# Patient Record
Sex: MALE | Race: WHITE | NOT HISPANIC OR LATINO | Employment: OTHER | URBAN - NONMETROPOLITAN AREA
[De-identification: names, ages, dates, MRNs, and addresses within clinical notes are randomized per-mention and may not be internally consistent; named-entity substitution may affect disease eponyms.]

---

## 2017-05-15 ENCOUNTER — AMBULATORY - GICH (OUTPATIENT)
Dept: UROLOGY | Facility: OTHER | Age: 67
End: 2017-05-15

## 2017-05-15 DIAGNOSIS — N40.2 NODULAR PROSTATE WITHOUT LOWER URINARY TRACT SYMPTOMS: ICD-10-CM

## 2017-06-29 ENCOUNTER — OFFICE VISIT - GICH (OUTPATIENT)
Dept: UROLOGY | Facility: OTHER | Age: 67
End: 2017-06-29

## 2017-06-29 ENCOUNTER — HISTORY (OUTPATIENT)
Dept: UROLOGY | Facility: OTHER | Age: 67
End: 2017-06-29

## 2017-06-29 ENCOUNTER — AMBULATORY - GICH (OUTPATIENT)
Dept: LAB | Facility: OTHER | Age: 67
End: 2017-06-29

## 2017-06-29 DIAGNOSIS — N40.2 NODULAR PROSTATE WITHOUT LOWER URINARY TRACT SYMPTOMS: ICD-10-CM

## 2017-06-29 DIAGNOSIS — R97.20 ELEVATED PROSTATE SPECIFIC ANTIGEN (PSA): ICD-10-CM

## 2017-06-29 LAB — PSA TOTAL (DIAGNOSTIC) - HISTORICAL: 4.43 NG/ML

## 2017-07-03 ENCOUNTER — AMBULATORY - GICH (OUTPATIENT)
Dept: UROLOGY | Facility: OTHER | Age: 67
End: 2017-07-03

## 2017-07-03 ENCOUNTER — HISTORY (OUTPATIENT)
Dept: UROLOGY | Facility: OTHER | Age: 67
End: 2017-07-03

## 2017-07-03 DIAGNOSIS — R97.20 ELEVATED PROSTATE SPECIFIC ANTIGEN (PSA): ICD-10-CM

## 2017-07-05 ENCOUNTER — COMMUNICATION - GICH (OUTPATIENT)
Dept: UROLOGY | Facility: OTHER | Age: 67
End: 2017-07-05

## 2017-07-14 ENCOUNTER — OFFICE VISIT - GICH (OUTPATIENT)
Dept: UROLOGY | Facility: OTHER | Age: 67
End: 2017-07-14

## 2017-07-14 ENCOUNTER — HISTORY (OUTPATIENT)
Dept: UROLOGY | Facility: OTHER | Age: 67
End: 2017-07-14

## 2017-07-14 DIAGNOSIS — Z80.42 FAMILY HISTORY OF MALIGNANT NEOPLASM OF PROSTATE: ICD-10-CM

## 2017-07-14 DIAGNOSIS — R97.20 ELEVATED PROSTATE SPECIFIC ANTIGEN (PSA): ICD-10-CM

## 2017-10-03 ENCOUNTER — COMMUNICATION - GICH (OUTPATIENT)
Dept: FAMILY MEDICINE | Facility: OTHER | Age: 67
End: 2017-10-03

## 2017-10-03 DIAGNOSIS — I10 ESSENTIAL (PRIMARY) HYPERTENSION: ICD-10-CM

## 2017-10-30 ENCOUNTER — COMMUNICATION - GICH (OUTPATIENT)
Dept: SURGERY | Facility: OTHER | Age: 67
End: 2017-10-30

## 2017-12-11 ENCOUNTER — COMMUNICATION - GICH (OUTPATIENT)
Dept: SURGERY | Facility: OTHER | Age: 67
End: 2017-12-11

## 2017-12-11 ENCOUNTER — HISTORY (OUTPATIENT)
Dept: FAMILY MEDICINE | Facility: OTHER | Age: 67
End: 2017-12-11

## 2017-12-11 ENCOUNTER — OFFICE VISIT - GICH (OUTPATIENT)
Dept: FAMILY MEDICINE | Facility: OTHER | Age: 67
End: 2017-12-11

## 2017-12-11 DIAGNOSIS — E66.9 OBESITY: ICD-10-CM

## 2017-12-11 DIAGNOSIS — Z12.11 ENCOUNTER FOR SCREENING FOR MALIGNANT NEOPLASM OF COLON: ICD-10-CM

## 2017-12-11 DIAGNOSIS — R97.20 ELEVATED PROSTATE SPECIFIC ANTIGEN (PSA): ICD-10-CM

## 2017-12-11 DIAGNOSIS — Z23 ENCOUNTER FOR IMMUNIZATION: ICD-10-CM

## 2017-12-11 DIAGNOSIS — I10 ESSENTIAL (PRIMARY) HYPERTENSION: ICD-10-CM

## 2017-12-11 DIAGNOSIS — Z00.00 ENCOUNTER FOR GENERAL ADULT MEDICAL EXAMINATION WITHOUT ABNORMAL FINDINGS: ICD-10-CM

## 2017-12-11 DIAGNOSIS — E78.5 HYPERLIPIDEMIA: ICD-10-CM

## 2017-12-11 LAB
ANION GAP - HISTORICAL: 12 (ref 5–18)
BUN SERPL-MCNC: 24 MG/DL (ref 7–25)
BUN/CREAT RATIO - HISTORICAL: 24
CALCIUM SERPL-MCNC: 9.6 MG/DL (ref 8.6–10.3)
CHLORIDE SERPLBLD-SCNC: 101 MMOL/L (ref 98–107)
CHOL/HDL RATIO - HISTORICAL: 4.88
CHOLESTEROL TOTAL: 161 MG/DL
CO2 SERPL-SCNC: 29 MMOL/L (ref 21–31)
CREAT SERPL-MCNC: 1.02 MG/DL (ref 0.7–1.3)
GFR IF NOT AFRICAN AMERICAN - HISTORICAL: >60 ML/MIN/1.73M2
GLUCOSE SERPL-MCNC: 93 MG/DL (ref 70–105)
HDLC SERPL-MCNC: 33 MG/DL (ref 23–92)
LDLC SERPL CALC-MCNC: 64 MG/DL
NON-HDL CHOLESTEROL - HISTORICAL: 128 MG/DL
POTASSIUM SERPL-SCNC: 3.7 MMOL/L (ref 3.5–5.1)
PROVIDER ORDERDED STATUS - HISTORICAL: ABNORMAL
SODIUM SERPL-SCNC: 142 MMOL/L (ref 133–143)
TRIGL SERPL-MCNC: 319 MG/DL

## 2017-12-18 ENCOUNTER — AMBULATORY - GICH (OUTPATIENT)
Dept: UROLOGY | Facility: OTHER | Age: 67
End: 2017-12-18

## 2017-12-18 DIAGNOSIS — R97.20 ELEVATED PROSTATE SPECIFIC ANTIGEN (PSA): ICD-10-CM

## 2017-12-28 NOTE — PROGRESS NOTES
"Patient Information     Patient Name MRMarbin Gilliam [id number] Male 1950      Progress Notes by Rupesh Dozier MD at 7/3/2017 11:45 AM     Author:  Rupesh Dozier MD Service:  (none) Author Type:  Physician     Filed:  7/3/2017 12:28 PM Encounter Date:  7/3/2017 Status:  Signed     :  Rupesh Dozier MD (Physician)            Preoperative diagnosis  Elevated PSA    Postoperative diagnosis  Elevated PSA    Procedure  Prostate biopsy  Transrectal ultrasound of the prostate  Transrectal ultrasound guidance of needle biopsy  Intramuscular Injection of local anesthetic, periprostatic nerve block    Surgeon  Rupesh Dozier MD    Anesthesia  1% lidocaine jelly, intrarectal instillation, 10mL  2% lidocaine solution, periprostatic injection, 10mL    Complications  None    Specimen  Prostate biopsy x 12 cores    Indications  Mr. Adams is a 67 y.o. year old male with an elevated PSA.  After discussing his options, the patient decided to proceed with prostate biopsy.  Informed consent was obtained. Possible complications were discussed with the patient during his last visit including, but not limited to, hematuria, hematochezia, prostatitis, urinary tract infection, sepsis, and urinary retention.    PSA TOTAL (DIAGNOSTIC) (ng/mL)    Date Value   2017 4.430 (H)   10/31/2013 1.58         Exam  Prostate:   Normal rectal tone, 50 grams.      Symmetric, non-tender, anodular and no induration.      Procedure  The patient was positioned and prepped in a left lateral position with lower extremities flexed.  Lidocaine jelly, 2%, was injected per rectum and gentamicin 120mg was injected intramuscularly. ARCHIE was performed.  The rectal ultrasound probe was slowly introduced into the rectum.  A 22 gauge, 8\" needle was used to perform a periprostatic injection of lidocaine 1%, 10mL through the ultrasound probe.  The prostate and seminal vesicles were inspected systematically using cross and sagittal views with the " ultrasound.  There were not hypoechoic areas within the prostate.  The dimensions of the prostate were measured to be 46mm X 56mm X 40mm, for a calculated volume of 55g.  Using a true cut 14 Fr biopsy needle, 12 prostate cores were collected. The specific locations on the left were the following: lateral base, lateral mid, lateral apex, medial base, medial mid, and medial apex.  The right side was sampled in a similar manner.  The ultrasound probe was removed.  The patient tolerated the procedure well.    Plan  The patient was instructed to drink plenty of fluids and warned about possible complications and side effects including, but not limited to, blood in the urine, stool and semen as well as bloodstream infection.  He was instructed to call the office or go straight to the ED if he develops fevers or flu-like symptoms.    A hand out explaining this was provided as well  The patient will return to clinic within a week for discussion of the path report.

## 2017-12-28 NOTE — PROGRESS NOTES
Patient Information     Patient Name MRN Sex Marbin Lord [id number] Male 1950      Progress Notes by Rupesh Dozier MD at 2017 10:00 AM     Author:  Rupesh Dozier MD Service:  (none) Author Type:  Physician     Filed:  2017 10:49 AM Encounter Date:  2017 Status:  Signed     :  Rupesh Dozier MD (Physician)            Type of Visit  EST    Chief Complaint  Elevated PSA  Abnormal ARCHIE  Family history of prostate cancer    HPI  Mr. Adams is a 65 y.o. male who follows up with an abnormal ARCHIE.  He does have a family history of prostate cancer.  His brother was diagnosed 12 years ago in his 60s and underwent prostatectomy.  He did well following surgery.    The patient has not previously undergone prostate biopsy.  No associated worsening LUTS, dysuria or prostatitis at the time of the PSA.  The most recent PSA was collected today.  I saw him last one year ago.  He reports no changes in the interim regarding his health.  He also denies any new lower urinary tract symptoms.      Family History  Family History       Problem   Relation Age of Onset     Other  Mother      Hx of TIAs       Other  Father      Dementia       Cancer-prostate  Brother      Other  Brother      Skin cancer       Other  Sister       as an infant         Review of Systems  I reviewed the ROS the patient today.    Nursing Notes:   Chayito Shaffer  2017 10:16 AM  Signed  Patient presents to the clinic for follow up on PSA levels.  Chayito Shaffer LPN........................2017  10:00 AM    Review of Systems:    Weight loss:    No     Recent fever/chills:  No   Night sweats:   No  Current skin rash:  No   Recent hair loss:  No  Heat intolerance:  No   Cold intolerance:  No  Chest pain:   No   Palpitations:   No  Shortness of breath:  No   Wheezing:   No  Constipation:    No   Diarrhea:   No   Nausea:   No   Vomiting:   No   Kidney/side pain:  No   Back pain:   No  Frequent headaches:  No   Dizziness:      No  Leg swelling:   No   Calf pain:    No        Physical Exam  Vitals:     06/29/17 1001   BP: 132/66   Pulse: 60   Weight: 100.4 kg (221 lb 6.4 oz)   Constitutional: No acute distress.  Alert and cooperative   Head: NCAT  Eyes: Conjunctivae normal  Cardiovascular: Regular rate.  Pulmonary/Chest: Respirations are even and non-labored bilaterally, no audible wheezing  Abdominal: Soft. No distension, tenderness, masses or guarding.   Neurological: A + O x 3.  Cranial Nerves II-XII grossly intact.  Extremities: CAROL x 4, Warm. No clubbing.  No cyanosis.    Skin: Pink, warm and dry.  No visible rashes noted.  Psychiatric:  Normal mood and affect  Back:  No left CVA tenderness.  No right CVA tenderness.  Genitourinary:  Nonpalpable bladder  Normal male phallus without discharge or lesions, normal pubic hair distribution.    Testicles descended bilaterally.  Prostate:  Normal rectal tone, 30 grams.  Symmetric, non-tender, anodular and no induration.      Labs  Results for ANNITA ADAMS (MRN [id number]) as of 6/29/2017 10:16   10/31/2013 08:35 11/20/2014 11:26 5/22/2015 07:32 6/13/2016 09:27 6/29/2017 07:50   PSA TOTAL (DIAGNOSTIC) 1.58       PSA TOTAL (DIAGNOSTIC)  1.580 2.212 1.769 4.430 (H)       CREATININE (mg/dL)    Date Value   12/07/2016 0.94     Assessment & Plan  Mr. Adams is a 65 y.o. male who follows up with increasing PSA with family history of prostatye cancer.    I explained to the patient that an elevated PSA is a marker of risk of prostate cancer and a prostate biopsy would be the next step in diagnosis.  I explained that sampling error can occur with any biopsy and there is a risk of potentially missing a cancer that may be present.    I discussed the risks, benefits, and alternatives to prostate biopsy, including hematuria, hematochezia, and hematospermia.  I also discussed the risk of diagnosing a clinically-insignificant prostate cancer.  I discussed the risks of sepsis, which can be minimized by  prophylactic antibiotics.     Plan  Recommended TRUS biopsy of prostate   Xanax provided   Gentamicin 120mg IM prior to the biopsy.   Ciprofloxacin 500mg po bid for 3 days to start the day prior to biopsy.   Patient denies taking antiplatelets or anticoagulant medication.

## 2017-12-28 NOTE — PROGRESS NOTES
"Patient Information     Patient Name MRN Sex Marbin Lord [id number] Male 1950      Progress Notes by Rupesh Dozier MD at 2017 12:00 PM     Author:  Rupesh Dozier MD  Service:  (none) Author Type:  Physician     Filed:  2017 12:13 PM  Encounter Date:  2017 Status:  Addendum     :  Rupesh Dozier MD (Physician)        Related Notes: Original Note by Rupesh Dozier MD (Physician) filed at 2017 12:13 PM            Type of Visit  Established    Chief Complaint  Elevated PSA    HPI  Mr. Adams is a 67 y.o. male who is status post TRUS guided biopsy of the prostate.    He is doing well and denies current rectal bleeding or gross hematuria.   He denies fevers or chills.     He does have a family history of prostate cancer.  His brother was diagnosed 11 years ago in his 60s and underwent prostatectomy.      Review of Systems  I reviewed the ROS the patient today.    Nursing Notes:   Valencia Babcock RN  2017 12:00 PM  Signed  Review of Systems:    Weight loss:    No     Recent fever/chills:  No   Night sweats:   No  Current skin rash:  No   Recent hair loss:  No  Heat intolerance:  No   Cold intolerance:  No  Chest pain:   No   Palpitations:   No  Shortness of breath:  No   Wheezing:   No  Constipation:    No   Diarrhea:   No   Nausea:   No   Vomiting:   No   Kidney/side pain:  No   Back pain:   No  Frequent headaches:  No   Dizziness:     No  Leg swelling:   No   Calf pain:    No    Family History  Family History       Problem   Relation Age of Onset     Other  Mother      Hx of TIAs       Other  Father      Dementia       Cancer-prostate  Brother      Other  Brother      Skin cancer       Other  Sister       as an infant         Physical Exam  Vitals:     17 1158   BP: 110/78   Pulse: 64   Resp: 20   Weight: 98.7 kg (217 lb 9.6 oz)   Height: 1.803 m (5' 11\")     Constitutional: NAD, WDWN.  Cardiovascular: Regular rate.  Pulmonary/Chest: Respirations are even and " non-labored bilaterally.  Abdominal: Soft. No distension, tenderness, masses or guarding. No CVA tenderness.  Extremities: CAROL x 4, Warm. No clubbing.  No cyanosis.    Skin: Pink, warm and dry.  No rashes noted.  Prostate:   Normal rectal tone, 50 grams.      Symmetric, non-tender, anodular and no induration.      Labs  PSA TOTAL (DIAGNOSTIC) (ng/mL)    Date Value   06/29/2017 4.430 (H)   10/31/2013 1.58       Pathology  7/3/2017  Negative 12 cores out of 12 cores  Calculated prostate volume based on TRUS: 55 grams    Assessment  Mr. Adams is a 67 y.o. male who is status post prostate biopsy.    I discussed the pathology report, which revealed no evidence of cancer.    Plan  Follow up in 6 months with PSA.        I spent 10 minutes on this patient's visit and over half of this time was spent in face-to-face counseling regarding biopsy report, prognosis, importance of compliance and plans going forward.

## 2017-12-28 NOTE — TELEPHONE ENCOUNTER
Patient Information     Patient Name MRN Marbin Oneil [id number] Male 1950      Telephone Encounter by Valencia Babcock RN at 2017  9:11 AM     Author:  Valencia Babcock RN Service:  (none) Author Type:  NURS- Registered Nurse     Filed:  2017  9:11 AM Encounter Date:  2017 Status:  Signed     :  Valencia Babcock RN (NURS- Registered Nurse)            Verified that results are not back yet.  Valencia Babcock RN.........2017...9:11 AM

## 2017-12-29 NOTE — PATIENT INSTRUCTIONS
Patient Information     Patient Name MRN Marbin Oneil [id number] Male 1950      Patient Instructions by Valencia Babcock RN at 7/3/2017 11:45 AM     Author:  Valencia Babcock RN Service:  (none) Author Type:  NURS- Registered Nurse     Filed:  7/3/2017 11:56 AM Encounter Date:  7/3/2017 Status:  Signed     :  Valencia Babcock RN (NURS- Registered Nurse)            Home Care after Prostate Biopsy   Follow these guidelines for your care after your procedure.    Activity  Please limit your activity until there is no blood in your urine and no bleeding from the rectum.  This usually takes 2-3 days.  If you return to normal activity and blood returns in your urine or stool please return to limited activities.    Symptoms  You may notice some blood in your urine and/or stool.  Please increase water intake for the next few days to minimize burning.  Please avoid constipation by increasing fiber in your diet as this will make bleeding per rectum worse.  Also, you may experience blood in your semen.  This symptom is common after this procedure and it may take months for it to resolve.  If you experience this symptom there are no special precautions or management needed.    Contacts  General Questions: [phone]  Appointments:  [phone]  Emergencies:  [phone]    Emergency Information  If you develop fevers and/or chills of 101 degrees or greater please go to the emergency department immediately as this may represent a serious infection in your blood stream.  Once you get to the ED please have the physician call Dr Dozier immediately.    Follow up  Please follow up as scheduled to discuss the pathology results of the biopsy.

## 2017-12-30 NOTE — NURSING NOTE
Patient Information     Patient Name MRN Marbin Oneil [id number] Male 1950      Nursing Note by Chayito Shaffer at 2017 10:00 AM     Author:  Chayito Shaffer Service:  (none) Author Type:  (none)     Filed:  2017 10:16 AM Encounter Date:  2017 Status:  Signed     :  Chayito Shaffer            Patient presents to the clinic for follow up on PSA levels.  Chayito Shaffer LPN........................2017  10:00 AM    Review of Systems:    Weight loss:    No     Recent fever/chills:  No   Night sweats:   No  Current skin rash:  No   Recent hair loss:  No  Heat intolerance:  No   Cold intolerance:  No  Chest pain:   No   Palpitations:   No  Shortness of breath:  No   Wheezing:   No  Constipation:    No   Diarrhea:   No   Nausea:   No   Vomiting:   No   Kidney/side pain:  No   Back pain:   No  Frequent headaches:  No   Dizziness:     No  Leg swelling:   No   Calf pain:    No

## 2017-12-30 NOTE — NURSING NOTE
Patient Information     Patient Name MRN Sex Marbin Lord [id number] Male 1950      Nursing Note by Valencia Babcock RN at 2017 12:00 PM     Author:  Valencia Babcock RN Service:  (none) Author Type:  NURS- Registered Nurse     Filed:  2017 12:00 PM Encounter Date:  2017 Status:  Signed     :  Valencia Babcock RN (NURS- Registered Nurse)            Review of Systems:    Weight loss:    No     Recent fever/chills:  No   Night sweats:   No  Current skin rash:  No   Recent hair loss:  No  Heat intolerance:  No   Cold intolerance:  No  Chest pain:   No   Palpitations:   No  Shortness of breath:  No   Wheezing:   No  Constipation:    No   Diarrhea:   No   Nausea:   No   Vomiting:   No   Kidney/side pain:  No   Back pain:   No  Frequent headaches:  No   Dizziness:     No  Leg swelling:   No   Calf pain:    No

## 2017-12-30 NOTE — NURSING NOTE
Patient Information     Patient Name MRN Marbin Oneil [id number] Male 1950      Nursing Note by Valencia Babcock RN at 2017 12:00 PM     Author:  Valencia Babcock RN Service:  (none) Author Type:  NURS- Registered Nurse     Filed:  2017 12:56 PM Encounter Date:  2017 Status:  Signed     :  Valencia Babcock RN (NURS- Registered Nurse)            Vasectomy  Per verbal order read back by Rupesh Dozier MD to prep patient for vasectomy.  Patient positioned in supine position, perineum area prepped with chlorhexidene Gluconate and patient draped per sterile technique.    Lidocaine 2%  Lot #: [id number]  Expiration date: 2018  : [employer]  NDC: 3320-9764-85    [place] Protocol    A. Pre-procedure verification complete yes  1-relevant information / documentation available, reviewed and properly matched to the patient; 2-consent accurate and complete, 3-equipment and supplies available    B. Site marking complete N/A  Site marked if not in continuous attendance with patient    C. TIME OUT completed yes  Time Out was conducted just prior to starting procedure to verify the eight required elements: 1-patient identity, 2-consent accurate and complete, 3-position, 4-correct side/site marked (if applicable), 5-procedure, 6-relevant images / results properly labeled and displayed (if applicable), 7-antibiotics / irrigation fluids (if applicable), 8-safety precautions.    After procedure perineum area rinsed. Semen analysis container given to patient. Patient reminded to have a semen analysis performed 3 months after the procedure to confirm sterility and to ejaculate about 1-2 dozen times following the vasectomy and prior to semen collection. Discharge instructions reviewed with patient. Patient verbalized understanding of discharge instructions and discharged ambulatory.

## 2017-12-30 NOTE — NURSING NOTE
Patient Information     Patient Name MRN Sex Marbin Lord [id number] Male 1950      Nursing Note by Valencia Babcock RN at 7/3/2017 11:45 AM     Author:  Valencia Babcock RN Service:  (none) Author Type:  NURS- Registered Nurse     Filed:  7/3/2017 12:36 PM Encounter Date:  7/3/2017 Status:  Signed     :  Valencia Babocck RN (NURS- Registered Nurse)            TRUS  Per verbal order read back by Rupesh Dozier MD, Urojet 10mL 2% lidocaine jelly to be instilled into the rectum.  Patient positioned on left side with knees to chest.  Urojet- 10ml 2% Lidocaine jelly instilled into the rectum.    Urojet 2%  Lot#: [id number]  Expiration date:   : [employer]  NDC: [address]-5    Lidocaine 2%  Lot #: [id number]  Expiration date: 2017  : [employer]  NDC: 9672-0436-18    [place] Protocol    A. Pre-procedure verification complete yes  1-relevant information / documentation available, reviewed and properly matched to the patient; 2-consent accurate and complete, 3-equipment and supplies available    B. Site marking complete N/A  Site marked if not in continuous attendance with patient    C. TIME OUT completed yes  Time Out was conducted just prior to starting procedure to verify the eight required elements: 1-patient identity, 2-consent accurate and complete, 3-position, 4-correct side/site marked (if applicable), 5-procedure, 6-relevant images / results properly labeled and displayed (if applicable), 7-antibiotics / irrigation fluids (if applicable), 8-safety precautions.    After procedure perineum area rinsed. Discharge instructions reviewed with patient. Patient verbalized understanding of discharge instructions and discharged ambulatory.

## 2018-01-15 ENCOUNTER — HOSPITAL ENCOUNTER (OUTPATIENT)
Dept: SURGERY | Facility: OTHER | Age: 68
Discharge: HOME | End: 2018-01-15
Attending: SURGERY | Admitting: SURGERY

## 2018-01-15 ENCOUNTER — SURGERY (OUTPATIENT)
Dept: SURGERY | Facility: OTHER | Age: 68
End: 2018-01-15

## 2018-01-15 ENCOUNTER — TRANSFERRED RECORDS (OUTPATIENT)
Dept: MULTI SPECIALTY CLINIC | Facility: CLINIC | Age: 68
End: 2018-01-15

## 2018-01-15 ENCOUNTER — HISTORY (OUTPATIENT)
Dept: SURGERY | Facility: OTHER | Age: 68
End: 2018-01-15

## 2018-01-16 ENCOUNTER — AMBULATORY - GICH (OUTPATIENT)
Dept: LAB | Facility: OTHER | Age: 68
End: 2018-01-16

## 2018-01-16 DIAGNOSIS — R97.20 ELEVATED PROSTATE SPECIFIC ANTIGEN (PSA): ICD-10-CM

## 2018-01-16 LAB — PSA TOTAL (DIAGNOSTIC) - HISTORICAL: 5.22 NG/ML

## 2018-01-17 ENCOUNTER — AMBULATORY - GICH (OUTPATIENT)
Dept: SURGERY | Facility: OTHER | Age: 68
End: 2018-01-17

## 2018-01-17 ENCOUNTER — COMMUNICATION - GICH (OUTPATIENT)
Dept: SURGERY | Facility: OTHER | Age: 68
End: 2018-01-17

## 2018-01-17 ENCOUNTER — HISTORY (OUTPATIENT)
Dept: SURGERY | Facility: OTHER | Age: 68
End: 2018-01-17

## 2018-01-18 ENCOUNTER — OFFICE VISIT - GICH (OUTPATIENT)
Dept: UROLOGY | Facility: OTHER | Age: 68
End: 2018-01-18

## 2018-01-18 ENCOUNTER — HISTORY (OUTPATIENT)
Dept: UROLOGY | Facility: OTHER | Age: 68
End: 2018-01-18

## 2018-01-18 DIAGNOSIS — Z80.42 FAMILY HISTORY OF MALIGNANT NEOPLASM OF PROSTATE: ICD-10-CM

## 2018-01-18 DIAGNOSIS — R97.20 ELEVATED PROSTATE SPECIFIC ANTIGEN (PSA): ICD-10-CM

## 2018-01-26 VITALS
HEART RATE: 64 BPM | BODY MASS INDEX: 30.46 KG/M2 | RESPIRATION RATE: 20 BRPM | HEIGHT: 71 IN | WEIGHT: 217.6 LBS | DIASTOLIC BLOOD PRESSURE: 78 MMHG | SYSTOLIC BLOOD PRESSURE: 110 MMHG

## 2018-01-26 VITALS
SYSTOLIC BLOOD PRESSURE: 132 MMHG | BODY MASS INDEX: 31.32 KG/M2 | DIASTOLIC BLOOD PRESSURE: 66 MMHG | HEART RATE: 60 BPM | WEIGHT: 221.4 LBS

## 2018-01-26 VITALS — HEART RATE: 68 BPM | BODY MASS INDEX: 30.72 KG/M2 | HEIGHT: 71 IN | WEIGHT: 219.4 LBS | RESPIRATION RATE: 16 BRPM

## 2018-01-31 ENCOUNTER — DOCUMENTATION ONLY (OUTPATIENT)
Dept: FAMILY MEDICINE | Facility: OTHER | Age: 68
End: 2018-01-31

## 2018-01-31 PROBLEM — E66.9 OBESITY: Status: ACTIVE | Noted: 2018-01-31

## 2018-01-31 PROBLEM — Z00.00 HEALTH CARE MAINTENANCE: Status: ACTIVE | Noted: 2018-01-04

## 2018-01-31 PROBLEM — R97.20 ELEVATED PSA: Status: ACTIVE | Noted: 2017-07-14

## 2018-01-31 RX ORDER — ASPIRIN 81 MG
1 TABLET, DELAYED RELEASE (ENTERIC COATED) ORAL DAILY
COMMUNITY

## 2018-01-31 RX ORDER — POLYETHYLENE GLYCOL 3350, SODIUM CHLORIDE, SODIUM BICARBONATE, POTASSIUM CHLORIDE 420; 11.2; 5.72; 1.48 G/4L; G/4L; G/4L; G/4L
240 POWDER, FOR SOLUTION ORAL
COMMUNITY
Start: 2017-12-11 | End: 2018-02-19

## 2018-01-31 RX ORDER — HYDROCHLOROTHIAZIDE 25 MG/1
25 TABLET ORAL DAILY
COMMUNITY
Start: 2017-12-11 | End: 2018-12-14

## 2018-01-31 RX ORDER — LISINOPRIL 10 MG/1
10 TABLET ORAL DAILY
COMMUNITY
Start: 2017-12-11 | End: 2018-12-14

## 2018-01-31 RX ORDER — ATENOLOL 100 MG/1
100 TABLET ORAL DAILY
COMMUNITY
Start: 2017-12-11 | End: 2018-12-14

## 2018-02-09 VITALS
BODY MASS INDEX: 31.24 KG/M2 | RESPIRATION RATE: 14 BRPM | SYSTOLIC BLOOD PRESSURE: 110 MMHG | DIASTOLIC BLOOD PRESSURE: 70 MMHG | WEIGHT: 224 LBS

## 2018-02-09 VITALS
WEIGHT: 224 LBS | HEART RATE: 60 BPM | HEIGHT: 71 IN | BODY MASS INDEX: 31.36 KG/M2 | SYSTOLIC BLOOD PRESSURE: 120 MMHG | DIASTOLIC BLOOD PRESSURE: 84 MMHG

## 2018-02-12 NOTE — NURSING NOTE
Patient Information     Patient Name MRN Sex Marbin Lord [id number] Male 1950      Nursing Note by Evie Jimenez at 2017  7:45 AM     Author:  Evie Jimenez Service:  (none) Author Type:  (none)     Filed:  2017  7:56 AM Encounter Date:  2017 Status:  Signed     :  Evie Jimenez            Patient presents today as a follow-up on meds.   Evie Jimenez LPN  2017  7:48 AM

## 2018-02-12 NOTE — PROGRESS NOTES
Patient Information     Patient Name MRN Sex Marbin Lord [id number] Male 1950      Progress Notes by Jerel Burkett MD at 2018  7:34 AM     Author:  Jerel Burkett MD Service:  (none) Author Type:  Physician     Filed:  2018  7:35 AM Date of Service:  2018  7:34 AM Status:  Signed     :  Jerel Burkett MD (Physician)            Ainsley Brandon      []Catalino copied text  []Hover for attribution information  Screening Questions for the Scheduling of Screening Colonoscopies   (If Colonoscopy is diagnostic, Provider should review the chart before scheduling.)  Are you younger than 50 or older than 80?  NO   Do you take aspirin or fish oil?  FISH OIL  (if yes, tell patient to stop 1 week prior to Colonoscopy)  Do you take warfarin (Coumadin), clopidogrel (Plavix), apixaban (Eliquis), dabigatram (Pradaxa), rivaroxaban (Xarelto) or any blood thinner? NO  Do you use oxygen at home?  NO   Do you have kidney disease? NO   Are you on dialysis? NO   Have you had a stroke or heart attack in the last year? NO   Have you had a stent in your heart or any blood vessel in the last year? NO   Have you had a transplant of any organ? NO   Have you had a colonoscopy or upper endoscopy (EGD) before? YES          When?    -  [facility]   Date of scheduled Colonoscopy. 01/15/2018  Provider VERONICA   Pharmacy GLOBE         Marbin Adams is a 67 y.o. male who presents for review of chronic health issues.     HPI: Patient has a history of hypertension which historically has been well-controlled on atenolol, hydrochlorothiazide and lisinopril. He has been tolerating these medications well. His weight is up about 8 pounds in the last year but he has started to walk and has been trying to watch his diet more closely. He's been following with Dr. Dozier for a rising PSA and had negative prostate biopsies in July. He is scheduled to see him again in January. He is due for colonoscopy. He has mild dyslipidemia and is managing  this with diet.     PROBLEM LIST:       Patient Active Problem List     Diagnosis  Code     DIVERTICULOSIS OF COLON K57.30     HYPERTENSION, BENIGN ESSENTIAL, CONTROLLED I10     EXOGENOUS OBESITY E66.9     Anal fissure K60.2     Dyslipidemia E78.5     Prostate nodule N40.2     Family history of prostate cancer Z80.42     Elevated PSA R97.20      PAST MEDICAL HISTORY:   Past Medical History         Past Medical History:   Diagnosis Date     HTN (hypertension)           SURGICAL HISTORY:   Past Surgical History          Past Surgical History:   Procedure Laterality Date     ANKLE FRACTURE TREATMENT        right     COLONOSCOPY SCREENING   2007     Due 2017     TONSILLECTOMY               SOCIAL HISTORY:   Social History    Social History            Social History     Marital status:        Spouse name: N/A     Number of children: N/A     Years of education: N/A          Occupational History     Not on file.              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       with two boys.  Enjoys fishing.         FAMILY HISTORY:         Family History       Problem   Relation Age of Onset     Other  Mother         Hx of TIAs       Other  Father         Dementia       Cancer-prostate  Brother       Other  Brother         Skin cancer       Other  Sister          as an infant        CURRENT MEDICATIONS:   Current Outpatient Prescriptions       Medication  Sig Dispense Refill     atenolol (TENORMIN) 100 mg tablet Take 1 tablet by mouth once daily. 90 tablet 3     hydroCHLOROthiazide (HCTZ) 25 mg tablet Take 1 tablet by mouth once daily. 90  "tablet 3     krill-omega-3-dha-epa-lipids (KRILL OIL) 780-32-77-50 mg cap Take 1 capsule by mouth once daily.   0     lisinopril (PRINIVIL; ZESTRIL) 10 mg tablet Take 1 tablet by mouth once daily. 90 tablet 3     MULTIVITAMIN W-MINERALS/LUTEIN (CENTRUM SILVER ORAL) Take 1 tablet by mouth once daily.          No current facility-administered medications for this visit.       Medications have been reviewed by me and are current to the best of my knowledge and ability.     ALLERGIES:  Sulfa (sulfonamide antibiotics)     REVIEW OF SYSTEMS:  General: denies any general problems.  Eyes: denies problems  Ears/Nose/Throat: denies problems  Cardiovascular: denies problems  Respiratory: denies problems  Gastrointestinal: denies problems  Genitourinary: denies problems  Musculoskeletal: denies problems  Skin: denies problems  Neurologic: denies problems  Psychiatric: denies problems  Endocrine: denies problems  Heme/Lymphatic: denies problems  Allergic/Immunologic: denies problems  PHQ Depression Screening 12/11/2017   Date of PHQ exam (doc flow) 12/11/2017   1. Lack of interest/pleasure 0 - Not at all   2. Feeling down/depressed 0 - Not at all   PHQ-2 TOTAL SCORE 0   3. Trouble sleeping -   4. Decreased energy -   5. Appetite change -   6. Feelings of failure -   7. Trouble concentrating -   8. Activity level -   9. Hurting yourself -   PHQ-9 TOTAL SCORE -   PHQ-9 Severity Level -   Functional Impairment -   Some recent data might be hidden       OBJECTIVE:  /84  Pulse 60  Ht 1.791 m (5' 10.5\")  Wt 101.6 kg (224 lb)  BMI 31.69 kg/m2  EXAM:   General Appearance: Pleasant, alert, appropriate appearance for age. No acute distress  Head Exam: Normal. Normocephalic, atraumatic.  Eye Exam:  Normal external eye, conjunctiva, lids, cornea. RACHEL.  Ear Exam: Normal TM's bilaterally. Normal auditory canals and external ears. Non-tender.  Nose Exam: Normal external nose, mucus membranes, and septum.  OroPharynx Exam:  Dental " hygiene adequate. Normal buccal mucosa. Normal pharynx.  Neck Exam:  Supple, no masses or nodes.  Thyroid Exam: No nodules or enlargement.  Chest/Respiratory Exam: Normal chest wall and respirations. Clear to auscultation.  Cardiovascular Exam: Regular rate and rhythm. S1, S2, no murmur, click, gallop, or rubs.  Gastrointestinal Exam: Soft, non-tender, no masses or organomegaly.  Rectal Exam: deferred  Genitourinary Exam Male: Normal male genitalia. No discharge or penile ulcerations. No testicular masses or swelling.  Lymphatic Exam: Non-palpable nodes in neck, clavicular, axillary, or inguinal regions.  Musculoskeletal Exam: Back is straight and non-tender, full ROM of upper and lower extremities.  Foot Exam: Left and right foot: good pedal pulses, no lesions, nail hygiene good.  Skin: Several small seborrheic keratoses noted on the trunk.  Neurologic Exam: Nonfocal, symmetric DTRs, normal gross motor, tone coordination and no tremor.  Psychiatric Exam: Alert and oriented - appropriate affect.     ASSESSMENT/PLAN:      ICD-10-CM     1. HYPERTENSION, BENIGN ESSENTIAL, CONTROLLED  Blood pressure appears well controlled. Continue current treatment. Basic metabolic panel drawn today.  I10 atenolol (TENORMIN) 100 mg tablet        hydroCHLOROthiazide (HCTZ) 25 mg tablet        lisinopril (PRINIVIL; ZESTRIL) 10 mg tablet        BASIC METABOLIC PANEL   2. Class 1 obesity without serious comorbidity with body mass index (BMI) of 31.0 to 31.9 in adult, unspecified obesity type  Encouraged patient to continue efforts to lose weight.  E66.9        Z68.31     3. Dyslipidemia  Lipid panel drawn today.  E78.5 LIPID PANEL   4. Elevated PSA  He will follow-up with Dr. Dozier in January as scheduled.  R97.20     5. Need for prophylactic vaccination and inoculation against influenza Z23 FLU VACCINE => 65 YRS HIGH DOSE TRIVALENT IIV3 IM   6. Colon cancer screening Z12.11 COLONOSCOPY SCREENING-[facility]         Carrington Elliott MD

## 2018-02-12 NOTE — H&P
Patient Information     Patient Name MRN Marbin Oneil [id number] Male 1950      H&P by Jerel Burkett MD at 1/15/2018  8:45 AM     Author:  Jerel Burkett MD Service:  (none) Author Type:  Physician     Filed:  1/15/2018  8:46 AM Date of Service:  1/15/2018  8:45 AM Status:  Signed     :  Jerel Burkett MD (Physician)            History and Physical    CHIEF COMPLAINT / REASON FOR PROCEDURE:  Healthcare maintenance     PERTINENT HISTORY   Patient is a 67 y.o. male who presents today for colonoscopy for Healthcare maintenance .   Last colonoscopy .  Patient has no complaints.    Past Medical History:     Diagnosis  Date     HTN (hypertension)      Past Surgical History:      Procedure  Laterality Date     ANKLE FRACTURE TREATMENT      right       COLONOSCOPY SCREENING      Due        PROSTATE BIOPSY  2017    Negative       TONSILLECTOMY         Bleeding tendencies:  No    ALLERGIES/SENSITIVITIES:   Allergies     Allergen  Reactions     Sulfa (Sulfonamide Antibiotics) Nausea And Vomiting        CURRENT MEDICATIONS:    No current facility-administered medications on file prior to encounter.      Current Outpatient Prescriptions on File Prior to Encounter       Medication  Sig Dispense Refill     atenolol (TENORMIN) 100 mg tablet Take 1 tablet by mouth once daily. 90 tablet 3     hydroCHLOROthiazide (HCTZ) 25 mg tablet Take 1 tablet by mouth once daily. 90 tablet 3     krill-omega-3-dha-epa-lipids (KRILL OIL) 914-25-46-50 mg cap Take 1 capsule by mouth once daily.  0     lisinopril (PRINIVIL; ZESTRIL) 10 mg tablet Take 1 tablet by mouth once daily. 90 tablet 3     MULTIVITAMIN W-MINERALS/LUTEIN (CENTRUM SILVER ORAL) Take 1 tablet by mouth once daily.         Physical Exam:   /92 (Cuff Size: Adult Regular)  Pulse 70  Temp 97.2  F (36.2  C)  Resp 18  SpO2 96%   EXAM:  Chest/Respiratory Exam: Normal.  Cardiovascular Exam: Normal.        PLAN:  Colonoscopy, Patient  understands risks of bleeding, perforation, potential inability to reach cecum, aspiration and wishes to proceed.

## 2018-02-12 NOTE — TELEPHONE ENCOUNTER
Patient Information     Patient Name MRN Sex Marbin Lord [id number] Male 1950      Telephone Encounter by Ainsley Brandon at 2017  3:32 PM     Author:  Ainsley Brandon Service:  (none) Author Type:  (none)     Filed:  2017  3:35 PM Encounter Date:  2017 Status:  Signed     :  Ainsley Brandon            Screening Questions for the Scheduling of Screening Colonoscopies   (If Colonoscopy is diagnostic, Provider should review the chart before scheduling.)  Are you younger than 50 or older than 80?  NO   Do you take aspirin or fish oil?  FISH OIL  (if yes, tell patient to stop 1 week prior to Colonoscopy)  Do you take warfarin (Coumadin), clopidogrel (Plavix), apixaban (Eliquis), dabigatram (Pradaxa), rivaroxaban (Xarelto) or any blood thinner? NO  Do you use oxygen at home?  NO   Do you have kidney disease? NO   Are you on dialysis? NO   Have you had a stroke or heart attack in the last year? NO   Have you had a stent in your heart or any blood vessel in the last year? NO   Have you had a transplant of any organ? NO   Have you had a colonoscopy or upper endoscopy (EGD) before? YES          When?    -  [facility]   Date of scheduled Colonoscopy. 01/15/2018  Provider VERONICA Waddell GLOBE

## 2018-02-12 NOTE — OR ANESTHESIA
Patient Information     Patient Name MRN Sex     Marbin Adams [id number] Male 1950      OR Anesthesia by Sarah Grove CRNA at 1/15/2018  9:11 AM     Author:  Sarah Grove CRNA Service:  (none) Author Type:  NURS- Nurse Anesthetist     Filed:  1/15/2018  9:11 AM Date of Service:  1/15/2018  9:11 AM Status:  Signed     :  Sarah Grove CRNA (NURS- Nurse Anesthetist)                                                           ANESTHESIA ASSESSMENT    Date: 1/15/18 Time: 9:11 AM      Patient:  Marbin Adams    Procedure(s) (LRB):  COLONOSCOPY (N/A)    Past Medical History:     Diagnosis  Date     HTN (hypertension)        Past Surgical History:      Procedure  Laterality Date     ANKLE FRACTURE TREATMENT      right       COLONOSCOPY SCREENING      Due        PROSTATE BIOPSY  2017    Negative       TONSILLECTOMY         Family History       Problem   Relation Age of Onset     Other  Mother      Hx of TIAs       Other  Father      Dementia       Cancer-prostate  Brother      Other  Brother      Skin cancer       Other  Sister       as an infant         Patient Active Problem List     Diagnosis  Code     DIVERTICULOSIS OF COLON K57.30     HYPERTENSION, BENIGN ESSENTIAL, CONTROLLED I10     EXOGENOUS OBESITY E66.9     Dyslipidemia E78.5     Prostate nodule N40.2     Family history of prostate cancer Z80.42     Elevated PSA R97.20     Health care maintenance Z00.00       Prescriptions Prior to Admission       Medication  Sig Dispense Refill     atenolol (TENORMIN) 100 mg tablet Take 1 tablet by mouth once daily. 90 tablet 3     hydroCHLOROthiazide (HCTZ) 25 mg tablet Take 1 tablet by mouth once daily. 90 tablet 3     krill-omega-3-dha-epa-lipids (KRILL OIL) 212-75-02-50 mg cap Take 1 capsule by mouth once daily.  0     lisinopril (PRINIVIL; ZESTRIL) 10 mg tablet Take 1 tablet by mouth once daily. 90 tablet 3     MULTIVITAMIN W-MINERALS/LUTEIN (CENTRUM SILVER ORAL) Take 1  tablet by mouth once daily.         Allergies:  Allergies     Allergen  Reactions     Sulfa (Sulfonamide Antibiotics) Nausea And Vomiting       Review of Systems:  GERD: No  Chest pain: No  Shortness of breath: No  Recent fever: No  Poor exercise tolerance: No  Bleeding tendency: No  Pregnant: No  Anesthesia Complications: None      History    Smoking Status      Never Smoker   Smokeless Tobacco      Never Used     Social History     Social History        Marital status:       Spouse name: N/A     Number of children:  N/A     Years of education:  N/A     Social History Main Topics         Smoking status:   Never Smoker     Smokeless tobacco:   Never Used     Alcohol use   0.5 oz/week     1 Standard drinks or equivalent per week        Comment: occasionally      Drug use:   No     Sexual activity:   Not Asked     Other Topics  Concern     Seat Belt Yes     Social History Narrative     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      with two boys.  Enjoys fishing.       Physical Examination:  /92 (Cuff Size: Adult Regular)  Pulse 70  Temp 97.2  F (36.2  C)  Resp 18  SpO2 96% There is no height or weight on file to calculate BMI. There is no height or weight on file to calculate BSA.  Dental Condition: Good     Mallampati Score (Airway): III  Cardiovascular: Normal  Pulmonary: Normal  Other: (not recorded)    [facility]    No results for input(s): SODIUM, POTASSIUM, CHLORIDE, TN2QPZDT, ANIONGAP, BUN, CREATININE, BUNCREARATIO, CALCIUM, GLUCOSE, GLUCOSEMETER, KETONES, MAGNESIUM, WBC, HGB, HCT, PLT, ABORH, RHTYPE, PREGURINE, BHCGQL, HCGBETAQUANT, INR in the last 72 hours.          Assessment/Plan:  ASA Class: II  Risk of dental injury discussed: Yes  NPO status confirmed: Yes  Anesthetic Plan: MAC  Risk/Benefit/Alt discussed: Yes  Questions answered: Yes  Emergency Case?: No  Labs/ECG/Radiology Reviewed?: Yes      H&P Reviewed.  Patient  Examined.      Provider Electronic Signature:  Sarah Grove, CRNA

## 2018-02-12 NOTE — PROGRESS NOTES
Patient Information     Patient Name MRN Sex Marbin Lord [id number] Male 1950      Progress Notes by Carrington Elliott MD at 2017  7:45 AM     Author:  Carrington Elliott MD Service:  (none) Author Type:  Physician     Filed:  2017  8:26 AM Encounter Date:  2017 Status:  Signed     :  Carrington Elliott MD (Physician)            SUBJECTIVE:    Marbin Adams is a 67 y.o. male who presents for review of chronic health issues.    HPI: Patient has a history of hypertension which historically has been well-controlled on atenolol, hydrochlorothiazide and lisinopril. He has been tolerating these medications well. His weight is up about 8 pounds in the last year but he has started to walk and has been trying to watch his diet more closely. He's been following with Dr. Dozier for a rising PSA and had negative prostate biopsies in July. He is scheduled to see him again in January. He is due for colonoscopy. He has mild dyslipidemia and is managing this with diet.    PROBLEM LIST:  Patient Active Problem List     Diagnosis  Code     DIVERTICULOSIS OF COLON K57.30     HYPERTENSION, BENIGN ESSENTIAL, CONTROLLED I10     EXOGENOUS OBESITY E66.9     Anal fissure K60.2     Dyslipidemia E78.5     Prostate nodule N40.2     Family history of prostate cancer Z80.42     Elevated PSA R97.20     PAST MEDICAL HISTORY:  Past Medical History:     Diagnosis  Date     HTN (hypertension)      SURGICAL HISTORY:  Past Surgical History:      Procedure  Laterality Date     ANKLE FRACTURE TREATMENT      right       COLONOSCOPY SCREENING  2007    Due 2017       TONSILLECTOMY         SOCIAL HISTORY:  Social History     Social History        Marital status:       Spouse name: N/A     Number of children:  N/A     Years of education:  N/A     Occupational History      Not on file.     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  Enjoys fishing.     FAMILY HISTORY:  Family History       Problem   Relation Age of Onset     Other  Mother      Hx of TIAs       Other  Father      Dementia       Cancer-prostate  Brother      Other  Brother      Skin cancer       Other  Sister       as an infant        CURRENT MEDICATIONS:   Current Outpatient Prescriptions       Medication  Sig Dispense Refill     atenolol (TENORMIN) 100 mg tablet Take 1 tablet by mouth once daily. 90 tablet 3     hydroCHLOROthiazide (HCTZ) 25 mg tablet Take 1 tablet by mouth once daily. 90 tablet 3     krill-omega-3-dha-epa-lipids (KRILL OIL) 126-10-46-50 mg cap Take 1 capsule by mouth once daily.  0     lisinopril (PRINIVIL; ZESTRIL) 10 mg tablet Take 1 tablet by mouth once daily. 90 tablet 3     MULTIVITAMIN W-MINERALS/LUTEIN (CENTRUM SILVER ORAL) Take 1 tablet by mouth once daily.       No current facility-administered medications for this visit.      Medications have been reviewed by me and are current to the best of my knowledge and ability.    ALLERGIES:  Sulfa (sulfonamide antibiotics)    REVIEW OF SYSTEMS:  General: denies any general problems.  Eyes: denies problems  Ears/Nose/Throat: denies problems  Cardiovascular: denies problems  Respiratory: denies problems  Gastrointestinal: denies problems  Genitourinary: denies problems  Musculoskeletal: denies problems  Skin: denies problems  Neurologic: denies problems  Psychiatric: denies problems  Endocrine: denies problems  Heme/Lymphatic: denies problems  Allergic/Immunologic: denies problems  PHQ Depression Screening 2017   Date of PHQ exam (doc flow) 2017   1. Lack of interest/pleasure 0 -  "Not at all   2. Feeling down/depressed 0 - Not at all   PHQ-2 TOTAL SCORE 0   3. Trouble sleeping -   4. Decreased energy -   5. Appetite change -   6. Feelings of failure -   7. Trouble concentrating -   8. Activity level -   9. Hurting yourself -   PHQ-9 TOTAL SCORE -   PHQ-9 Severity Level -   Functional Impairment -   Some recent data might be hidden       OBJECTIVE:  /84  Pulse 60  Ht 1.791 m (5' 10.5\")  Wt 101.6 kg (224 lb)  BMI 31.69 kg/m2  EXAM:   General Appearance: Pleasant, alert, appropriate appearance for age. No acute distress  Head Exam: Normal. Normocephalic, atraumatic.  Eye Exam:  Normal external eye, conjunctiva, lids, cornea. RACHEL.  Ear Exam: Normal TM's bilaterally. Normal auditory canals and external ears. Non-tender.  Nose Exam: Normal external nose, mucus membranes, and septum.  OroPharynx Exam:  Dental hygiene adequate. Normal buccal mucosa. Normal pharynx.  Neck Exam:  Supple, no masses or nodes.  Thyroid Exam: No nodules or enlargement.  Chest/Respiratory Exam: Normal chest wall and respirations. Clear to auscultation.  Cardiovascular Exam: Regular rate and rhythm. S1, S2, no murmur, click, gallop, or rubs.  Gastrointestinal Exam: Soft, non-tender, no masses or organomegaly.  Rectal Exam: deferred  Genitourinary Exam Male: Normal male genitalia. No discharge or penile ulcerations. No testicular masses or swelling.  Lymphatic Exam: Non-palpable nodes in neck, clavicular, axillary, or inguinal regions.  Musculoskeletal Exam: Back is straight and non-tender, full ROM of upper and lower extremities.  Foot Exam: Left and right foot: good pedal pulses, no lesions, nail hygiene good.  Skin: Several small seborrheic keratoses noted on the trunk.  Neurologic Exam: Nonfocal, symmetric DTRs, normal gross motor, tone coordination and no tremor.  Psychiatric Exam: Alert and oriented - appropriate affect.    ASSESSMENT/PLAN:    ICD-10-CM    1. HYPERTENSION, BENIGN ESSENTIAL, CONTROLLED  Blood " pressure appears well controlled. Continue current treatment. Basic metabolic panel drawn today.  I10 atenolol (TENORMIN) 100 mg tablet      hydroCHLOROthiazide (HCTZ) 25 mg tablet      lisinopril (PRINIVIL; ZESTRIL) 10 mg tablet      BASIC METABOLIC PANEL   2. Class 1 obesity without serious comorbidity with body mass index (BMI) of 31.0 to 31.9 in adult, unspecified obesity type  Encouraged patient to continue efforts to lose weight.  E66.9      Z68.31    3. Dyslipidemia  Lipid panel drawn today.  E78.5 LIPID PANEL   4. Elevated PSA  He will follow-up with Dr. Dozier in January as scheduled.  R97.20    5. Need for prophylactic vaccination and inoculation against influenza Z23 FLU VACCINE => 65 YRS HIGH DOSE TRIVALENT IIV3 IM   6. Colon cancer screening Z12.11 COLONOSCOPY SCREENING-[facility]       Carrington Elliott MD

## 2018-02-13 NOTE — PROGRESS NOTES
Patient Information     Patient Name MRN Marbin Oneil [id number] Male 1950      Progress Notes by Rupesh Dozier MD at 2018  8:15 AM     Author:  Rupesh Dozier MD Service:  (none) Author Type:  Physician     Filed:  2018 10:08 AM Encounter Date:  2018 Status:  Signed     :  Rupesh Dozier MD (Physician)            Type of Visit  Established    Chief Complaint  Elevated PSA    HPI  Mr. Adams is a 67 y.o. male with history of negative prostate biopsy following up with elevated PSA.  He does have a family history of prostate cancer.  His brother was diagnosed 11 years ago in his 60s and underwent prostatectomy.  He denies unexplained weight loss, pelvic pain or bone pain.  He reports no changes to his health history in the last 6 months.  The prostate biopsy underwent was 6 months ago.  He underwent PSA recently and follows up to discuss results.      Review of Systems  I reviewed the ROS the patient today.    Nursing Notes:   Melinda Hayes  2018  8:33 AM  Signed  Here for 6 month follow up on elevated PSA.  Review of Systems:    Weight loss:    No     Recent fever/chills:  No   Night sweats:   No  Current skin rash:  No   Recent hair loss:  No  Heat intolerance:  No   Cold intolerance:  No  Chest pain:   No   Palpitations:   No  Shortness of breath:  No   Wheezing:   No  Constipation:    No   Diarrhea:   No   Nausea:   No   Vomiting:   No   Kidney/side pain:  No   Back pain:   No  Frequent headaches:  No   Dizziness:     No  Leg swelling:   No   Calf pain:    No    Melinda Hayes LPN  2018  8:16 AM                Family History  Family History       Problem   Relation Age of Onset     Other  Mother      Hx of TIAs       Other  Father      Dementia       Cancer-prostate  Brother      Other  Brother      Skin cancer       Other  Sister       as an infant         Physical Exam  Vitals:     18 0814   BP: 110/70   Cuff Site: Left Arm   Position: Sitting    Cuff Size: Adult Large   Resp: 14   Weight: 101.6 kg (224 lb)     Constitutional: NAD, WDWN.  Cardiovascular: Regular rate.  Pulmonary/Chest: Respirations are even and non-labored bilaterally.  Abdominal: Soft. No distension, tenderness, masses or guarding. No CVA tenderness.  Extremities: CAROL x 4, Warm. No clubbing.  No cyanosis.    Skin: Pink, warm and dry.  No rashes noted.  Prostate:   Normal rectal tone, 50 grams.      Symmetric, non-tender, anodular and no induration.      Labs  Results for ANNITA ADAMS (MRN [id number]) as of 1/18/2018 09:55   11/20/2014 11:26 5/22/2015 07:32 6/13/2016 09:27 6/29/2017 07:50 1/16/2018 08:05   PSA TOTAL (DIAGNOSTIC) 1.580 2.212 1.769 4.430 (H) 5.221 (H)     Pathology  7/3/2017  Negative 12 cores out of 12 cores  Calculated prostate volume based on TRUS: 55 grams    Assessment  Mr. Adams is a 67 y.o. male with history of negative prostate biopsy following up with elevated PSA.  Discussed PSA value.    Plan  Follow up in 6 months with PSA.

## 2018-02-13 NOTE — OR ANESTHESIA
Patient Information     Patient Name MRN Sex     Marbin Adams [id number] Male 1950      OR Anesthesia by Sarah Grove CRNA at 1/15/2018 10:40 AM     Author:  Sarah Grove CRNA Service:  (none) Author Type:  NURS- Nurse Anesthetist     Filed:  1/15/2018 10:40 AM Date of Service:  1/15/2018 10:40 AM Status:  Signed     :  Sarah Grove CRNA (NURS- Nurse Anesthetist)            Anesthesia Post Operative Care Note    Name: Marbin Adams  MRN:   [id number]  :    1950       Procedure Done:  See Surgeon Note        Anesthesia Technique    Anesthetic Type:  MAC       MAC Type:  NC     Oral Trauma:  No    Intraoperative Course   Hemodynamics:  Stable    Ventilation Normal:  Yes Lung Sounds:  Normal      PACU Course        Nondepolarizer Used:       Reversed: N/A   Hemodynamics:  Stable      Hydration: Euvolemic   Temperature:  36.1 - 38.3      Mental Status:  Awake, alert, follows commands   Pain Management:  Adequate   Regional Block:  No      Vital Signs:  Temp: 97.2  F (36.2  C)  Pulse: 70  BP: 142/92  Resp: 18  SpO2: 96 %                       Active Lines:  Patient Lines/Drains/Airways Status    Active Line     Name: Placement date: Placement time: Site: Days:    PERIPHERAL VAD Right Hand 22 01/15/18   0800   Hand   less than 1                Intake & Output:       Labs:  No results for input(s): QR4IQZSJQUD, FFM7LHIBKZFQ, PHARTERIAL, EZP8MYTZCAGH, I2NBEJOZUQZE in the last 24 hours.    No results for input(s): MAGNESIUM in the last 24 hours.    No results for input(s): GLUCOSEMETER in the last 720 hours.        Sarah Grove CRNA ....................  1/15/2018   10:40 AM

## 2018-02-13 NOTE — NURSING NOTE
Patient Information     Patient Name MRN Marbin Oneil [id number] Male 1950      Nursing Note by Melinda Hayes at 2018  8:15 AM     Author:  Melinda Hayes Service:  (none) Author Type:  (none)     Filed:  2018  8:33 AM Encounter Date:  2018 Status:  Signed     :  Melinda Hayes            Here for 6 month follow up on elevated PSA.  Review of Systems:    Weight loss:    No     Recent fever/chills:  No   Night sweats:   No  Current skin rash:  No   Recent hair loss:  No  Heat intolerance:  No   Cold intolerance:  No  Chest pain:   No   Palpitations:   No  Shortness of breath:  No   Wheezing:   No  Constipation:    No   Diarrhea:   No   Nausea:   No   Vomiting:   No   Kidney/side pain:  No   Back pain:   No  Frequent headaches:  No   Dizziness:     No  Leg swelling:   No   Calf pain:    No    Melinda Hayes LPN  2018  8:16 AM

## 2018-02-13 NOTE — OR POSTOP
Patient Information     Patient Name MRN Sex Marbin Lord [id number] Male 1950      OR PostOp by Alona Heaton RN at 1/15/2018 11:14 AM     Author:  Alona Heaton RN Service:  (none) Author Type:  NURS- Registered Nurse     Filed:  1/15/2018 11:15 AM Date of Service:  1/15/2018 11:14 AM Status:  Signed     :  Alona Heaton RN (NURS- Registered Nurse)            Discharge Note    Data:  Marbin Adams has been discharged home at 1110 via ambulatory accompanied by Registered Nurse.      Action:  Written discharge/follow-up instructions were provided to patient. Prescriptions : None.  Belongings sent with patient. Medications from home sent with patient/family: Not Applicable  Equipment none .     Response:  Patient verbalized understanding of discharge instructions, reason for discharge, and necessary follow-up appointments.

## 2018-02-13 NOTE — OR SURGEON
Patient Information     Patient Name MRN Sex Marbin Lord [id number] Male 1950      OR Surgeon by Jerel Burkett MD at 1/15/2018 10:29 AM     Author:  Jerel Burkett MD Service:  (none) Author Type:  Physician     Filed:  1/15/2018 10:32 AM Date of Service:  1/15/2018 10:29 AM Status:  Signed     :  Jerel Burkett MD (Physician)            PROCEDURE NOTE    DATE OF SERVICE: 1/15/2018    SURGEON: Jerel Burkett MD    PRE-OP DIAGNOSIS:  Screening    POST-OP DIAGNOSIS:  Sigmoid Diverticulosis and Polyps at cecum, 15 cm and rectum    PROCEDURE: Colonoscopy with snare polypectomy  ANESTHESIA:  CHRISTIANO Grove CRNA    INDICATION FOR THE PROCEDURE: The patient is a 67 y.o. male in need of Healthcare maintenance  . The patient has no complaints  . After explaining the risks to include bleeding, perforation, potential inability to reach the cecum, the patient wished to proceed.    PROCEDURE:After adequate sedation, the patient was in the left lateral decubitus position.  Rectal exam was performed.  There was normal tone and no palpable masses  .  The colonoscope was introduced into the rectum and advanced to the cecum with mild difficulty.  The patient's prep was Good.  The terminal cecum was reached.  The cecum, ascending, transverse, descending and sigmoid colon was with sigmoid diverticulosis and diminutive polyp at base of cecum that was hot biopsied and destroyed. At 15 cm a pedunculated less than 1 cm polyp was completely removed by snare .  The scope was retroflexed in the rectum.  The rectum was nremarkable for small polyp completely removed by snare .  The scope was straightened and removed.  The patient tolerated the procedure well.     ESTIMATED BLOOD LOSS: none    COMPLICATIONS:  None    TISSUE REMOVED:  yes    RECOMMEND:  follow up pending pathology, fiber supplement and given literature on diverticulosis      Jerel Burkett MD FACS

## 2018-02-19 ENCOUNTER — OFFICE VISIT (OUTPATIENT)
Dept: FAMILY MEDICINE | Facility: OTHER | Age: 68
End: 2018-02-19
Attending: FAMILY MEDICINE
Payer: COMMERCIAL

## 2018-02-19 VITALS
BODY MASS INDEX: 31.92 KG/M2 | DIASTOLIC BLOOD PRESSURE: 72 MMHG | HEIGHT: 71 IN | SYSTOLIC BLOOD PRESSURE: 120 MMHG | WEIGHT: 228 LBS

## 2018-02-19 DIAGNOSIS — H69.91 DYSFUNCTION OF RIGHT EUSTACHIAN TUBE: Primary | ICD-10-CM

## 2018-02-19 PROCEDURE — G0463 HOSPITAL OUTPT CLINIC VISIT: HCPCS

## 2018-02-19 PROCEDURE — 99213 OFFICE O/P EST LOW 20 MIN: CPT | Performed by: FAMILY MEDICINE

## 2018-02-19 NOTE — MR AVS SNAPSHOT
"              After Visit Summary   2018    Marbin Adams    MRN: [id number]           Patient Information     Date Of Birth          1950        Visit Information        Provider Department      2018 10:30 AM Raudel Prado MD [facility]        Today's Diagnoses     Dysfunction of right eustachian tube    -  1       Follow-ups after your visit        Who to contact     If you have questions or need follow up information about today's clinic visit or your schedule please contact [facility] directly at [phone].  Normal or non-critical lab and imaging results will be communicated to you by Qloohart, letter or phone within 4 business days after the clinic has received the results. If you do not hear from us within 7 days, please contact the clinic through bMobilizedt or phone. If you have a critical or abnormal lab result, we will notify you by phone as soon as possible.  Submit refill requests through [employer] or call your pharmacy and they will forward the refill request to us. Please allow 3 business days for your refill to be completed.          Additional Information About Your Visit        MyChart Information     [employer] lets you send messages to your doctor, view your test results, renew your prescriptions, schedule appointments and more. To sign up, go to [email or web address] . Click on \"Log in\" on the left side of the screen, which will take you to the Welcome page. Then click on \"Sign up Now\" on the right side of the page.     You will be asked to enter the access code listed below, as well as some personal information. Please follow the directions to create your username and password.     Your access code is: [email or web address]  Expires: 2018 10:39 AM     Your access code will  in 90 days. If you need help or a new code, please call your [place] clinic or [phone].        Care EveryWhere ID     This is your Care EveryWhere ID. This " "could be used by other organizations to access your [facility] medical records  [id number]        Your Vitals Were     Height BMI (Body Mass Index)                5' 10.5\" (1.791 m) 32.25 kg/m2           Blood Pressure from Last 3 Encounters:   02/19/18 120/72   01/18/18 110/70   12/11/17 120/84    Weight from Last 3 Encounters:   02/19/18 228 lb (103.4 kg)   01/18/18 224 lb (101.6 kg)   12/11/17 224 lb (101.6 kg)              Today, you had the following     No orders found for display       Primary Care Provider Fax #    Physician No Ref-Primary [phone]       No address on file        Equal Access to Services     ROCHELLE BORRERO : Mounika Ovalles, pato duncan, albina kaalmavesta keller, radha jackson . So [facility] [phone].    ATENCIÓN: Si habla español, tiene a foster disposición servicios gratuitos de asistencia lingüística. Llame al [phone].    We comply with applicable federal civil rights laws and [place] laws. We do not discriminate on the basis of race, color, national origin, age, disability, sex, sexual orientation, or gender identity.            Thank you!     Thank you for choosing [facility] AND [facility]  for your care. Our goal is always to provide you with excellent care. Hearing back from our patients is one way we can continue to improve our services. Please take a few minutes to complete the written survey that you may receive in the mail after your visit with us. Thank you!             Your Updated Medication List - Protect others around you: Learn how to safely use, store and throw away your medicines at www.disposemymeds.org.          This list is accurate as of 2/19/18 10:58 AM.  Always use your most recent med list.                   Brand Name Dispense Instructions for use Diagnosis    atenolol 100 MG tablet    TENORMIN     Take 100 mg by mouth daily        DOCOSAHEXAENOIC ACID PO      Take 1 capsule by mouth daily        " hydrochlorothiazide 25 MG tablet    HYDRODIURIL     Take 25 mg by mouth daily        lisinopril 10 MG tablet    PRINIVIL/ZESTRIL     Take 10 mg by mouth daily        THERA-M Tabs      Take 1 tablet by mouth daily

## 2018-02-19 NOTE — PROGRESS NOTES
"  SUBJECTIVE:   Marbin Adams is a 67 year old male who presents to clinic today for the following health issues: comes in as new pt to me stating for the past 5 days hehas not been able to hear normally out of his R ear..it seems muffled but no pain- he thinks he has allergies and has noted some sniffles. Afeb. No TMJ sx               Problem list and histories reviewed & adjusted, as indicated.  Additional history: as documented        Reviewed and updated as needed this visit by clinical staff  Tobacco  Allergies  Meds       Reviewed and updated as needed this visit by Provider           OBJECTIVE:     /72 (BP Location: Right arm, Patient Position: Sitting, Cuff Size: Adult Large)  Ht 5' 10.5\" (1.791 m)  Wt 228 lb (103.4 kg)  BMI 32.25 kg/m2  Body mass index is 32.25 kg/(m^2).  GENERAL: healthy, alert and no distress  HENT: ear canals and TM's normal, nose and mouth without ulcers or lesions        ASSESSMENT/PLAN:           1. Dysfunction of right eustachian tube  Afrin and claritin rx given along w education      See Patient Instructions    YUMI SHAH MD  [facility] AND \[facility] [place] [facility]\""  "

## 2018-05-09 DIAGNOSIS — R97.20 ELEVATED PSA: Primary | ICD-10-CM

## 2018-07-23 ENCOUNTER — OFFICE VISIT (OUTPATIENT)
Dept: UROLOGY | Facility: OTHER | Age: 68
End: 2018-07-23
Attending: UROLOGY
Payer: MEDICARE

## 2018-07-23 VITALS — HEART RATE: 60 BPM | BODY MASS INDEX: 31.26 KG/M2 | WEIGHT: 221 LBS | RESPIRATION RATE: 12 BRPM

## 2018-07-23 DIAGNOSIS — R97.20 ELEVATED PROSTATE SPECIFIC ANTIGEN (PSA): ICD-10-CM

## 2018-07-23 DIAGNOSIS — R97.20 ELEVATED PSA: ICD-10-CM

## 2018-07-23 DIAGNOSIS — R97.20 ELEVATED PROSTATE SPECIFIC ANTIGEN (PSA): Primary | ICD-10-CM

## 2018-07-23 LAB — PSA SERPL-MCNC: 2.82 NG/ML

## 2018-07-23 PROCEDURE — G0463 HOSPITAL OUTPT CLINIC VISIT: HCPCS

## 2018-07-23 PROCEDURE — 99213 OFFICE O/P EST LOW 20 MIN: CPT | Performed by: UROLOGY

## 2018-07-23 PROCEDURE — 36415 COLL VENOUS BLD VENIPUNCTURE: CPT | Performed by: UROLOGY

## 2018-07-23 PROCEDURE — 36416 COLLJ CAPILLARY BLOOD SPEC: CPT | Performed by: UROLOGY

## 2018-07-23 PROCEDURE — 84153 ASSAY OF PSA TOTAL: CPT | Performed by: UROLOGY

## 2018-07-23 ASSESSMENT — PAIN SCALES - GENERAL: PAINLEVEL: NO PAIN (0)

## 2018-07-23 NOTE — MR AVS SNAPSHOT
After Visit Summary   7/23/2018    Marbin Adams    MRN: [id number]           Patient Information     Date Of Birth          1950        Visit Information        Provider Department      7/23/2018 9:00 AM Rupesh Dozier MD [facility]        Today's Diagnoses     Elevated prostate specific antigen (PSA)    -  1       Follow-ups after your visit        Who to contact     If you have questions or need follow up information about today's clinic visit or your schedule please contact [facility] directly at [phone].  Normal or non-critical lab and imaging results will be communicated to you by MyChart, letter or phone within 4 business days after the clinic has received the results. If you do not hear from us within 7 days, please contact the clinic through MyChart or phone. If you have a critical or abnormal lab result, we will notify you by phone as soon as possible.  Submit refill requests through [employer] or call your pharmacy and they will forward the refill request to us. Please allow 3 business days for your refill to be completed.          Additional Information About Your Visit        Care EveryWhere ID     This is your Care EveryWhere ID. This could be used by other organizations to access your [facility] medical records  [id number]        Your Vitals Were     Pulse Respirations BMI (Body Mass Index)             60 12 31.26 kg/m2          Blood Pressure from Last 3 Encounters:   02/19/18 120/72   01/18/18 110/70   12/11/17 120/84    Weight from Last 3 Encounters:   07/23/18 100.2 kg (221 lb)   02/19/18 103.4 kg (228 lb)   01/18/18 101.6 kg (224 lb)               Primary Care Provider Fax #    Physician No Ref-Primary [phone]       No address on file        Equal Access to Services     ROCHELLE BORRERO : pato Pierre qaybta kaalmada adeegyada, waxay idiin hayaan adeeg kharash la'aan ah. So [facility]  [phone].    ATENCIÓN: Si marvin august, tiene a foster disposición servicios gratuitos de asistencia lingüística. Angel al [phone].    We comply with applicable federal civil rights laws and [place] laws. We do not discriminate on the basis of race, color, national origin, age, disability, sex, sexual orientation, or gender identity.            Thank you!     Thank you for choosing [facility] AND [facility]  for your care. Our goal is always to provide you with excellent care. Hearing back from our patients is one way we can continue to improve our services. Please take a few minutes to complete the written survey that you may receive in the mail after your visit with us. Thank you!             Your Updated Medication List - Protect others around you: Learn how to safely use, store and throw away your medicines at www.disposemymeds.org.          This list is accurate as of 7/23/18 10:37 AM.  Always use your most recent med list.                   Brand Name Dispense Instructions for use Diagnosis    atenolol 100 MG tablet    TENORMIN     Take 100 mg by mouth daily        DOCOSAHEXAENOIC ACID PO      Take 1 capsule by mouth daily        hydrochlorothiazide 25 MG tablet    HYDRODIURIL     Take 25 mg by mouth daily        lisinopril 10 MG tablet    PRINIVIL/ZESTRIL     Take 10 mg by mouth daily        multivitamin, therapeutic with minerals Tabs tablet      Take 1 tablet by mouth daily

## 2018-07-23 NOTE — PROGRESS NOTES
Patient Information     Patient Name  Marbin Adams MRN  [id number] Sex  Male   1950      Letter by Jerel Burkett MD at      Author:  Jerel Burkett MD Service:  (none) Author Type:  (none)    Filed:   Encounter Date:  2018 Status:  (Other)           Marbin Adams  [address]  [place] [facility] [address]          2018    Dear Mr. Adams:    This letter is in regards to your colonoscopy that was done by Jerel Burkett MD on 01/15/18.   The polyp(s) removed from your colon were TUBULAR ADENOMAS.  Adenomatous polyps are  pre-cancerous, yet BENIGN.  The polyp was removed. You have an increased risk for developing other polyps in the future.  For that reason, Dr. Jerel Burkett MD recommends a repeat colonoscopy in 5 years unless you have problems.       Diverticulosis was found during your colonoscopy. Dr. Jerel Burkett MD  recommends a high fiber diet. A fiber supplement such as Metamucil, FiberCon, or Citrucel is recommended. Generic forms of these supplements are fine. These are available over the counter.     If you have any questions regarding this colonoscopy or your results, please call the Surgery department at [phone].  A copy of your colonoscopy report will be placed in your electronic chart for your primary care provider's review.    Sincerely,        Andreina PATEL LPN  General Surgery    Reviewed and electronically signed by provider.

## 2018-07-23 NOTE — PROGRESS NOTES
Patient Information     Patient Name  Marbin Adams MRN  [id number] Sex  Male   1950      Letter by Jerel Burkett MD at      Author:  Jerel Burkett MD Service:  (none) Author Type:  (none)    Filed:   Encounter Date:  10/30/2017 Status:  (Other)           Marbin Adams  [address]  [place] [address]          2017    Dear Mr. Adams:    It has come to our attention that you are due for a colonoscopy.  According to our records, your last colonoscopy was done on 12/3/07 by Jerel Burkett MD.  It  is time for your repeat colonoscopy.  Please contact the Surgery department at [phone] and we will be happy to set up this colonoscopy for you.  If you have had a colonoscopy since your last one with us, please let us know so we can update our records.      Sincerely,       Flower BRENNAN LPN  General Surgery      Reviewed and electronically signed by provider.

## 2018-07-23 NOTE — NURSING NOTE
Pt presents to clinic for 6 month follow up on PSA    Review of Systems:    Weight loss:    No     Recent fever/chills:  No   Night sweats:   No  Current skin rash:  No   Recent hair loss:  No  Heat intolerance:  No   Cold intolerance:  No  Chest pain:   No   Palpitations:   No  Shortness of breath:  No   Wheezing:   No  Constipation:    No   Diarrhea:   No   Nausea:   No   Vomiting:   No   Kidney/side pain:  No   Back pain:   No  Frequent headaches:  No   Dizziness:     No  Leg swelling:   No   Calf pain:    No

## 2018-07-23 NOTE — PROGRESS NOTES
Type of Visit  Established    Chief Complaint  Elevated PSA    HPI  Mr. Adams is a 68 y.o. male with history of negative prostate biopsy following up with elevated PSA.  He does have a family history of prostate cancer.  His brother was diagnosed 11 years ago in his 60s and underwent prostatectomy.  He denies unexplained weight loss, pelvic pain or bone pain.  He follows up today 6 months from the last visit with a PSA.  He denies any healthcare changes in the last 6 months.      Review of Systems  I reviewed the ROS the patient today.    Nursing Notes:   Mona Oakes LPN  2018  9:18 AM  Signed  Pt presents to clinic for 6 month follow up on PSA    Review of Systems:    Weight loss:    No     Recent fever/chills:  No   Night sweats:   No  Current skin rash:  No   Recent hair loss:  No  Heat intolerance:  No   Cold intolerance:  No  Chest pain:   No   Palpitations:   No  Shortness of breath:  No   Wheezing:   No  Constipation:    No   Diarrhea:   No   Nausea:   No   Vomiting:   No   Kidney/side pain:  No   Back pain:   No  Frequent headaches:  No   Dizziness:     No  Leg swelling:   No   Calf pain:    No        Family History  Family History   Problem Relation Age of Onset     Other Mother      Hx of TIAs     Other Father      Dementia     Cancer-prostate Brother      Other Brother      Skin cancer     Other Sister       as an infant       Physical Exam  Pulse 60  Resp 12  Wt 100.2 kg (221 lb)  BMI 31.26 kg/m2  Constitutional: NAD, WDWN.  Cardiovascular: Regular rate.  Pulmonary/Chest: Respirations are even and non-labored bilaterally.  Abdominal: Soft. No distension, tenderness, masses or guarding. No CVA tenderness.  Extremities: CAROL x 4, Warm. No clubbing.  No cyanosis.    Skin: Pink, warm and dry.  No rashes noted.  Prostate:   Normal rectal tone, 50 grams.      Symmetric, non-tender, anodular and no induration.      Labs  Results for ANNITA ADAMS (MRN [id number]) as of 2018 10:03    7/23/2018 07:08   Prostate Specific Antigen 2.817     Results for ANNITA ADAMS (MRN [id number]) as of 1/18/2018 09:55   11/20/2014 11:26 5/22/2015 07:32 6/13/2016 09:27 6/29/2017 07:50 1/16/2018 08:05   PSA TOTAL  1.580 2.212 1.769 4.430 (H) 5.221 (H)       Pathology  7/3/2017  Negative 12 cores out of 12 cores  Calculated prostate volume based on TRUS: 55 grams    Assessment  Mr. Adams is a 68 y.o. male with history of negative prostate biopsy following up with elevated PSA.  Recent PSA excellent.    Plan  Follow up in 1 year with PSA.

## 2018-12-14 ENCOUNTER — OFFICE VISIT (OUTPATIENT)
Dept: FAMILY MEDICINE | Facility: OTHER | Age: 68
End: 2018-12-14
Attending: FAMILY MEDICINE
Payer: COMMERCIAL

## 2018-12-14 VITALS
BODY MASS INDEX: 32 KG/M2 | WEIGHT: 226.2 LBS | DIASTOLIC BLOOD PRESSURE: 76 MMHG | RESPIRATION RATE: 16 BRPM | SYSTOLIC BLOOD PRESSURE: 126 MMHG | HEART RATE: 60 BPM

## 2018-12-14 DIAGNOSIS — I10 ESSENTIAL HYPERTENSION, BENIGN: ICD-10-CM

## 2018-12-14 DIAGNOSIS — E78.5 DYSLIPIDEMIA: ICD-10-CM

## 2018-12-14 DIAGNOSIS — Z00.00 ROUTINE GENERAL MEDICAL EXAMINATION AT A HEALTH CARE FACILITY: Primary | ICD-10-CM

## 2018-12-14 DIAGNOSIS — Z23 NEED FOR INFLUENZA VACCINATION: ICD-10-CM

## 2018-12-14 LAB
ANION GAP SERPL CALCULATED.3IONS-SCNC: 6 MMOL/L (ref 3–14)
BUN SERPL-MCNC: 21 MG/DL (ref 7–25)
CALCIUM SERPL-MCNC: 10 MG/DL (ref 8.6–10.3)
CHLORIDE SERPL-SCNC: 101 MMOL/L (ref 98–107)
CHOLEST SERPL-MCNC: 154 MG/DL
CO2 SERPL-SCNC: 33 MMOL/L (ref 21–31)
CREAT SERPL-MCNC: 0.9 MG/DL (ref 0.7–1.3)
GFR SERPL CREATININE-BSD FRML MDRD: 84 ML/MIN/1.7M2
GLUCOSE SERPL-MCNC: 105 MG/DL (ref 70–105)
HDLC SERPL-MCNC: 32 MG/DL (ref 23–92)
LDLC SERPL CALC-MCNC: 69 MG/DL
NONHDLC SERPL-MCNC: 122 MG/DL
POTASSIUM SERPL-SCNC: 4.1 MMOL/L (ref 3.5–5.1)
SODIUM SERPL-SCNC: 140 MMOL/L (ref 134–144)
TRIGL SERPL-MCNC: 264 MG/DL

## 2018-12-14 PROCEDURE — 36415 COLL VENOUS BLD VENIPUNCTURE: CPT | Performed by: FAMILY MEDICINE

## 2018-12-14 PROCEDURE — 90662 IIV NO PRSV INCREASED AG IM: CPT

## 2018-12-14 PROCEDURE — 80061 LIPID PANEL: CPT | Performed by: FAMILY MEDICINE

## 2018-12-14 PROCEDURE — G0463 HOSPITAL OUTPT CLINIC VISIT: HCPCS | Mod: 25

## 2018-12-14 PROCEDURE — 99397 PER PM REEVAL EST PAT 65+ YR: CPT | Performed by: FAMILY MEDICINE

## 2018-12-14 PROCEDURE — G0008 ADMIN INFLUENZA VIRUS VAC: HCPCS

## 2018-12-14 PROCEDURE — 90471 IMMUNIZATION ADMIN: CPT

## 2018-12-14 PROCEDURE — G0463 HOSPITAL OUTPT CLINIC VISIT: HCPCS

## 2018-12-14 PROCEDURE — 80048 BASIC METABOLIC PNL TOTAL CA: CPT | Performed by: FAMILY MEDICINE

## 2018-12-14 RX ORDER — ATORVASTATIN CALCIUM 40 MG/1
40 TABLET, FILM COATED ORAL DAILY
Qty: 90 TABLET | Refills: 3 | Status: SHIPPED | OUTPATIENT
Start: 2018-12-14 | End: 2019-09-13

## 2018-12-14 RX ORDER — HYDROCHLOROTHIAZIDE 25 MG/1
25 TABLET ORAL DAILY
Qty: 90 TABLET | Refills: 3 | Status: SHIPPED | OUTPATIENT
Start: 2018-12-14 | End: 2019-09-13

## 2018-12-14 RX ORDER — ATENOLOL 100 MG/1
100 TABLET ORAL DAILY
Qty: 90 TABLET | Refills: 3 | Status: SHIPPED | OUTPATIENT
Start: 2018-12-14 | End: 2019-09-13

## 2018-12-14 RX ORDER — LISINOPRIL 10 MG/1
10 TABLET ORAL DAILY
Qty: 90 TABLET | Refills: 3 | Status: SHIPPED | OUTPATIENT
Start: 2018-12-14 | End: 2019-09-13

## 2018-12-14 ASSESSMENT — ANXIETY QUESTIONNAIRES
5. BEING SO RESTLESS THAT IT IS HARD TO SIT STILL: NOT AT ALL
2. NOT BEING ABLE TO STOP OR CONTROL WORRYING: NOT AT ALL
7. FEELING AFRAID AS IF SOMETHING AWFUL MIGHT HAPPEN: NOT AT ALL
1. FEELING NERVOUS, ANXIOUS, OR ON EDGE: NOT AT ALL
6. BECOMING EASILY ANNOYED OR IRRITABLE: NOT AT ALL
GAD7 TOTAL SCORE: 0
3. WORRYING TOO MUCH ABOUT DIFFERENT THINGS: NOT AT ALL
IF YOU CHECKED OFF ANY PROBLEMS ON THIS QUESTIONNAIRE, HOW DIFFICULT HAVE THESE PROBLEMS MADE IT FOR YOU TO DO YOUR WORK, TAKE CARE OF THINGS AT HOME, OR GET ALONG WITH OTHER PEOPLE: NOT DIFFICULT AT ALL

## 2018-12-14 ASSESSMENT — PATIENT HEALTH QUESTIONNAIRE - PHQ9: 5. POOR APPETITE OR OVEREATING: NOT AT ALL

## 2018-12-14 ASSESSMENT — PAIN SCALES - GENERAL: PAINLEVEL: NO PAIN (0)

## 2018-12-14 NOTE — PROGRESS NOTES
SUBJECTIVE:   CC: Marbin Adams is an 68 year old male who presents for preventive health visit.     Healthy Habits:    Do you get at least three servings of calcium containing foods daily (dairy, green leafy vegetables, etc.)? yes    Amount of exercise or daily activities, outside of work: 5-7 day(s) per week    Problems taking medications regularly No    Medication side effects: No    Have you had an eye exam in the past two years? yes    Do you see a dentist twice per year? yes    Do you have sleep apnea, excessive snoring or daytime drowsiness?no      HYPERTENSION follow up as well.  Exercises often and has no side effects at all from meds.    Today's PHQ-2 Score:   PHQ-2 ( 1999 Pf[employer]) 12/14/2018 2/19/2018   Q1: Little interest or pleasure in doing things 0 0   Q2: Feeling down, depressed or hopeless 0 0   PHQ-2 Score 0 0       Abuse: Current or Past(Physical, Sexual or Emotional)- No  Do you feel safe in your environment? Yes    Social History     Tobacco Use     Smoking status: Never Smoker     Smokeless tobacco: Never Used   Substance Use Topics     Alcohol use: Yes     Alcohol/week: 0.5 oz     Comment: Alcoholic Drinks/day: occasionally     If you drink alcohol do you typically have >3 drinks per day or >7 drinks per week? No                      Last PSA: No results found for: PSA    Reviewed orders with patient. Reviewed health maintenance and updated orders accordingly - Yes  Labs reviewed in EPIC    Reviewed and updated as needed this visit by clinical staff  Tobacco  Allergies  Meds  Med Hx  Surg Hx  Fam Hx  Soc Hx        Reviewed and updated as needed this visit by Provider        Past Medical History:   Diagnosis Date     Essential (primary) hypertension     No Comments Provided      Past Surgical History:   Procedure Laterality Date     CLOSED REDUCTION ANKLE      1962,right     COLONOSCOPY      2007,Due 2017     COLONOSCOPY      01/15/2018,F/U 2023     OTHER SURGICAL HISTORY       07/2017,[id number],PROSTATE BIOPSY,Negative     TONSILLECTOMY      1957       ROS:  CONSTITUTIONAL: NEGATIVE for fever, chills, change in weight  INTEGUMENTARY/SKIN: NEGATIVE for worrisome rashes, moles or lesions  EYES: NEGATIVE for vision changes or irritation  ENT: NEGATIVE for ear, mouth and throat problems  RESP: NEGATIVE for significant cough or SOB  CV: NEGATIVE for chest pain, palpitations or peripheral edema  GI: NEGATIVE for nausea, abdominal pain, heartburn, or change in bowel habits   male: negative for dysuria, hematuria, decreased urinary stream, erectile dysfunction, urethral discharge  MUSCULOSKELETAL: NEGATIVE for significant arthralgias or myalgia  NEURO: NEGATIVE for weakness, dizziness or paresthesias  PSYCHIATRIC: NEGATIVE for changes in mood or affect    OBJECTIVE:   /76 (BP Location: Right arm, Patient Position: Sitting, Cuff Size: Adult Large)   Pulse 60   Resp 16   Wt 102.6 kg (226 lb 3.2 oz)   BMI 32.00 kg/m    EXAM:  GENERAL: healthy, alert and no distress  EYES: Eyes grossly normal to inspection, PERRL and conjunctivae and sclerae normal  HENT: ear canals and TM's normal, nose and mouth without ulcers or lesions  NECK: no adenopathy, no asymmetry, masses, or scars and thyroid normal to palpation  RESP: lungs clear to auscultation - no rales, rhonchi or wheezes  CV: regular rate and rhythm, normal S1 S2, no S3 or S4, no murmur, click or rub, no peripheral edema and peripheral pulses strong  ABDOMEN: soft, nontender, no hepatosplenomegaly, no masses and bowel sounds normal  MS: no gross musculoskeletal defects noted, no edema  SKIN: no suspicious lesions or rashes  NEURO: Normal strength and tone, mentation intact and speech normal  PSYCH: mentation appears normal, affect normal/bright    Diagnostic Test Results:  Results for orders placed or performed in visit on 12/14/18 (from the past 24 hour(s))   Lipid Panel   Result Value Ref Range    Cholesterol 154 <200 mg/dL     "Triglycerides 264 (H) <150 mg/dL    HDL Cholesterol 32 23 - 92 mg/dL    LDL Cholesterol Calculated 69 <100 mg/dL    Non HDL Cholesterol 122 <130 mg/dL   Basic Metabolic Panel   Result Value Ref Range    Sodium 140 134 - 144 mmol/L    Potassium 4.1 3.5 - 5.1 mmol/L    Chloride 101 98 - 107 mmol/L    Carbon Dioxide 33 (H) 21 - 31 mmol/L    Anion Gap 6 3 - 14 mmol/L    Glucose 105 70 - 105 mg/dL    Urea Nitrogen 21 7 - 25 mg/dL    Creatinine 0.90 0.70 - 1.30 mg/dL    GFR Estimate 84 >60 mL/min/1.7m2    GFR Estimate If Black >90 >60 mL/min/1.7m2    Calcium 10.0 8.6 - 10.3 mg/dL       ASSESSMENT/PLAN:   (Z00.00) Routine general medical examination at a health care facility  (primary encounter diagnosis)  Comment: stable  Plan:      (I10) Essential hypertension, benign  Comment: stable  Plan: atenolol (TENORMIN) 100 MG tablet,         hydrochlorothiazide (HYDRODIURIL) 25 MG tablet,        lisinopril (PRINIVIL/ZESTRIL) 10 MG tablet,         Basic Metabolic Panel        refilled    (E78.5) Dyslipidemia  Comment:    Plan: Lipid Panel        Start statin.  The 10-year ASCVD risk score (Pippamena THAPA Jr., et al., 2013) is: 19.2%    Values used to calculate the score:      Age: 68 years      Sex: Male      Is Non- : No      Diabetic: No      Tobacco smoker: No      Systolic Blood Pressure: 126 mmHg      Is BP treated: Yes      HDL Cholesterol: 33 mg/dL      Total Cholesterol: 161 mg/dL    Will start lipitor and follow up 6 weeks.    (Z23) Need for influenza vaccination  Comment:    Plan:      COUNSELING:  Reviewed preventive health counseling, as reflected in patient instructions       Regular exercise       Healthy diet/nutrition       Prostate cancer screening    BP Readings from Last 1 Encounters:   12/14/18 126/76     Estimated body mass index is 32 kg/m  as calculated from the following:    Height as of 2/19/18: 1.791 m (5' 10.5\").    Weight as of this encounter: 102.6 kg (226 lb 3.2 oz).           " reports that  has never smoked. he has never used smokeless tobacco.      Counseling Resources:  ATP IV Guidelines  Pooled Cohorts Equation Calculator  FRAX Risk Assessment  ICSI Preventive Guidelines  Dietary Guidelines for Americans, 2010  USDA's MyPlate  ASA Prophylaxis  Lung CA Screening    Darin Pappas MD  [facility] AND [facility]

## 2018-12-14 NOTE — NURSING NOTE
"Coming in for a physical     Chief Complaint   Patient presents with     Physical     check up and flu shot       Initial /76 (BP Location: Right arm, Patient Position: Sitting, Cuff Size: Adult Large)   Pulse 60   Resp 16   Wt 102.6 kg (226 lb 3.2 oz)   BMI 32.00 kg/m   Estimated body mass index is 32 kg/m  as calculated from the following:    Height as of 2/19/18: 1.791 m (5' 10.5\").    Weight as of this encounter: 102.6 kg (226 lb 3.2 oz).  Medication Reconciliation: complete    Latasha Juarez LPN    "

## 2018-12-14 NOTE — PATIENT INSTRUCTIONS
Preventive Health Recommendations:     See your health care provider every year to    Review health changes.     Discuss preventive care.      Review your medicines if your doctor has prescribed any.      Talk with your health care provider about whether you should have a test to screen for prostate cancer (PSA).    Every 3 years, have a diabetes test (fasting glucose). If you are at risk for diabetes, you should have this test more often.    Every 5 years, have a cholesterol test. Have this test more often if you are at risk for high cholesterol or heart disease.     Every 10 years, have a colonoscopy. Or, have a yearly FIT test (stool test). These exams will check for colon cancer.    Talk to with your health care provider about screening for Abdominal Aortic Aneurysm if you have a family history of AAA or have a history of smoking.    Shots:     Get a flu shot each year.     Get a tetanus shot every 10 years.     Talk to your doctor about your pneumonia vaccines. There are now two you should receive - Pneumovax (PPSV 23) and Prevnar (PCV 13).     Talk to your pharmacist about a shingles vaccine.     Talk to your doctor about the hepatitis B vaccine.  Nutrition:     Eat at least 5 servings of fruits and vegetables each day.     Eat whole-grain bread, whole-wheat pasta and brown rice instead of white grains and rice.     Get adequate Calcium and Vitamin D.   Lifestyle    Exercise for at least 150 minutes a week (30 minutes a day, 5 days a week). This will help you control your weight and prevent disease.     Limit alcohol to one drink per day.     No smoking.     Wear sunscreen to prevent skin cancer.    See your dentist every six months for an exam and cleaning.    See your eye doctor every 1 to 2 years to screen for conditions such as glaucoma, macular degeneration, cataracts, etc.    Personalized Prevention Plan  You are due for the preventive services outlined below.  Your care team is available to assist you  in scheduling these services.  If you have already completed any of these items, please share that information with your care team to update in your medical record.  Health Maintenance Due   Topic Date Due     Hepatitis C Screening  06/16/1968     Diptheria Tetanus Pertussis (DTAP/TDAP/TD) Vaccine (1 - Tdap) 06/16/1975     Zoster (Chicken Pox) Vaccine (1 of 2) 06/16/2000     Pneumococcal Vaccine (1 of 2 - PCV13) 06/16/2015     AORTIC ANEURYSM SCREENING (SYSTEM ASSIGNED)  06/16/2015     Flu Vaccine (1) 09/01/2018

## 2018-12-14 NOTE — PROGRESS NOTES
Injectable Influenza Immunization Documentation    1.  Is the person to be vaccinated sick today?   No    2. Does the person to be vaccinated have an allergy to a component   of the vaccine?   No  Egg Allergy Algorithm Link    3. Has the person to be vaccinated ever had a serious reaction   to influenza vaccine in the past?   No    4. Has the person to be vaccinated ever had Guillain-Barré syndrome?   No    Form completed by Latasha Juarez LPN on 12/14/2018 at 10:06 AM  VERIFIED

## 2018-12-14 NOTE — LETTER
December 14, 2018      Marbinpaulo Adams  [address]  [place] [address]        Dear Marbin,     This is all about the same.    Results for orders placed or performed in visit on 12/14/18   Lipid Panel   Result Value Ref Range    Cholesterol 154 <200 mg/dL    Triglycerides 264 (H) <150 mg/dL    HDL Cholesterol 32 23 - 92 mg/dL    LDL Cholesterol Calculated 69 <100 mg/dL    Non HDL Cholesterol 122 <130 mg/dL   Basic Metabolic Panel   Result Value Ref Range    Sodium 140 134 - 144 mmol/L    Potassium 4.1 3.5 - 5.1 mmol/L    Chloride 101 98 - 107 mmol/L    Carbon Dioxide 33 (H) 21 - 31 mmol/L    Anion Gap 6 3 - 14 mmol/L    Glucose 105 70 - 105 mg/dL    Urea Nitrogen 21 7 - 25 mg/dL    Creatinine 0.90 0.70 - 1.30 mg/dL    GFR Estimate 84 >60 mL/min/1.7m2    GFR Estimate If Black >90 >60 mL/min/1.7m2    Calcium 10.0 8.6 - 10.3 mg/dL           Sincerely,        Darin Pappas MD

## 2018-12-15 ASSESSMENT — ANXIETY QUESTIONNAIRES: GAD7 TOTAL SCORE: 0

## 2019-05-22 DIAGNOSIS — R97.20 ELEVATED PSA: Primary | ICD-10-CM

## 2019-07-25 ENCOUNTER — OFFICE VISIT (OUTPATIENT)
Dept: UROLOGY | Facility: OTHER | Age: 69
End: 2019-07-25
Attending: UROLOGY
Payer: MEDICARE

## 2019-07-25 VITALS
BODY MASS INDEX: 32.54 KG/M2 | WEIGHT: 230 LBS | HEART RATE: 68 BPM | DIASTOLIC BLOOD PRESSURE: 72 MMHG | SYSTOLIC BLOOD PRESSURE: 128 MMHG | RESPIRATION RATE: 16 BRPM

## 2019-07-25 DIAGNOSIS — R97.20 ELEVATED PSA: ICD-10-CM

## 2019-07-25 DIAGNOSIS — R97.20 ELEVATED PSA: Primary | ICD-10-CM

## 2019-07-25 LAB — PSA SERPL-MCNC: 5.46 NG/ML

## 2019-07-25 PROCEDURE — 36415 COLL VENOUS BLD VENIPUNCTURE: CPT | Performed by: UROLOGY

## 2019-07-25 PROCEDURE — 84153 ASSAY OF PSA TOTAL: CPT | Performed by: UROLOGY

## 2019-07-25 PROCEDURE — G0463 HOSPITAL OUTPT CLINIC VISIT: HCPCS

## 2019-07-25 PROCEDURE — 99213 OFFICE O/P EST LOW 20 MIN: CPT | Performed by: UROLOGY

## 2019-07-25 ASSESSMENT — PAIN SCALES - GENERAL: PAINLEVEL: NO PAIN (0)

## 2019-07-25 NOTE — PROGRESS NOTES
Type of Visit  Established    Chief Complaint  Elevated PSA    HPI  Mr. Adams is a 69 y.o. male with history of negative prostate biopsy following up with elevated PSA.  He does have a family history of prostate cancer.  His brother was diagnosed 12 years ago in his 60s and underwent prostatectomy.  He continues to deny unexplained weight loss, significant changes in urinary function, pelvic and bone pain.  He denies any healthcare changes in the last year.  The most recent PSA was collected this morning and he follows up to review the results.      Review of Systems  I reviewed the ROS the patient today.    Nursing Notes:   Mona Oakes, LPN  2019 10:47 AM  Signed  Pt presents to clinic for a one year follow up for elevated PSA    Review of Systems:    Weight loss:    No     Recent fever/chills:  No   Night sweats:   No  Current skin rash:  No   Recent hair loss:  No  Heat intolerance:  No   Cold intolerance:  No  Chest pain:   No   Palpitations:   No  Shortness of breath:  No   Wheezing:   No  Constipation:    No   Diarrhea:   No   Nausea:   No   Vomiting:   No   Kidney/side pain:  No   Back pain:   No  Frequent headaches:  No   Dizziness:     No  Leg swelling:   No   Calf pain:    No      Family History  Family History   Problem Relation Age of Onset     Other Mother      Hx of TIAs     Other Father      Dementia     Cancer-prostate Brother      Other Brother      Skin cancer     Other Sister       as an infant       Physical Exam  /72 (BP Location: Right arm, Patient Position: Sitting, Cuff Size: Adult Regular)   Pulse 68   Resp 16   Wt 104.3 kg (230 lb)   BMI 32.54 kg/m    Constitutional: NAD, WDWN.  Cardiovascular: Regular rate.  Pulmonary/Chest: Respirations are even and non-labored bilaterally.  Abdominal: Soft. No distension, tenderness, masses or guarding. No CVA tenderness.  Extremities: CAROL x 4, Warm. No clubbing.  No cyanosis.    Skin: Pink, warm and dry.  No rashes  noted.  Prostate:   Normal rectal tone, 40-50 grams.      Symmetric, non-tender, anodular and no induration - stable    Labs  Results for ANNITA ADAMS (MRN [id number]) as of 7/25/2019 10:48   1/16/2018 08:50 7/23/2018 07:08 7/25/2019 08:07   Prostate Specific Antigen  2.817 5.462 (H)   PSA Total 5.221 (H)       Results for ANNITA ADAMS (MRN [id number]) as of 1/18/2018 09:55   11/20/2014 11:26 5/22/2015 07:32 6/13/2016 09:27 6/29/2017 07:50   PSA TOTAL  1.580 2.212 1.769 4.430 (H)     Pathology  7/3/2017  Negative 12 cores out of 12 cores  Calculated prostate volume based on TRUS: 55 grams    Assessment  Mr. Adams is a 69 y.o. male with history of negative prostate biopsy following up with elevated PSA.  Recent PSA excellent.    Plan  Follow up in 1 year with PSA.

## 2019-07-25 NOTE — NURSING NOTE
Pt presents to clinic for a one year follow up for elevated PSA    Review of Systems:    Weight loss:    No     Recent fever/chills:  No   Night sweats:   No  Current skin rash:  No   Recent hair loss:  No  Heat intolerance:  No   Cold intolerance:  No  Chest pain:   No   Palpitations:   No  Shortness of breath:  No   Wheezing:   No  Constipation:    No   Diarrhea:   No   Nausea:   No   Vomiting:   No   Kidney/side pain:  No   Back pain:   No  Frequent headaches:  No   Dizziness:     No  Leg swelling:   No   Calf pain:    No

## 2019-09-09 ENCOUNTER — OFFICE VISIT (OUTPATIENT)
Dept: FAMILY MEDICINE | Facility: OTHER | Age: 69
End: 2019-09-09
Attending: NURSE PRACTITIONER
Payer: COMMERCIAL

## 2019-09-09 VITALS
OXYGEN SATURATION: 98 % | HEIGHT: 71 IN | RESPIRATION RATE: 16 BRPM | HEART RATE: 63 BPM | TEMPERATURE: 96.8 F | BODY MASS INDEX: 31.52 KG/M2 | DIASTOLIC BLOOD PRESSURE: 80 MMHG | WEIGHT: 225.13 LBS | SYSTOLIC BLOOD PRESSURE: 118 MMHG

## 2019-09-09 DIAGNOSIS — F41.9 ANXIETY: Primary | ICD-10-CM

## 2019-09-09 PROCEDURE — 99214 OFFICE O/P EST MOD 30 MIN: CPT | Performed by: NURSE PRACTITIONER

## 2019-09-09 PROCEDURE — G0463 HOSPITAL OUTPT CLINIC VISIT: HCPCS

## 2019-09-09 RX ORDER — HYDROXYZINE PAMOATE 50 MG/1
50 CAPSULE ORAL 4 TIMES DAILY PRN
Qty: 30 CAPSULE | Refills: 0 | Status: SHIPPED | OUTPATIENT
Start: 2019-09-09 | End: 2019-09-24

## 2019-09-09 ASSESSMENT — ANXIETY QUESTIONNAIRES
GAD7 TOTAL SCORE: 11
1. FEELING NERVOUS, ANXIOUS, OR ON EDGE: NEARLY EVERY DAY
3. WORRYING TOO MUCH ABOUT DIFFERENT THINGS: NEARLY EVERY DAY
5. BEING SO RESTLESS THAT IT IS HARD TO SIT STILL: NOT AT ALL
2. NOT BEING ABLE TO STOP OR CONTROL WORRYING: NEARLY EVERY DAY
7. FEELING AFRAID AS IF SOMETHING AWFUL MIGHT HAPPEN: NOT AT ALL
IF YOU CHECKED OFF ANY PROBLEMS ON THIS QUESTIONNAIRE, HOW DIFFICULT HAVE THESE PROBLEMS MADE IT FOR YOU TO DO YOUR WORK, TAKE CARE OF THINGS AT HOME, OR GET ALONG WITH OTHER PEOPLE: NOT DIFFICULT AT ALL
6. BECOMING EASILY ANNOYED OR IRRITABLE: NOT AT ALL

## 2019-09-09 ASSESSMENT — PAIN SCALES - GENERAL: PAINLEVEL: NO PAIN (0)

## 2019-09-09 ASSESSMENT — PATIENT HEALTH QUESTIONNAIRE - PHQ9
SUM OF ALL RESPONSES TO PHQ QUESTIONS 1-9: 4
5. POOR APPETITE OR OVEREATING: MORE THAN HALF THE DAYS

## 2019-09-09 ASSESSMENT — MIFFLIN-ST. JEOR: SCORE: 1800.35

## 2019-09-09 NOTE — NURSING NOTE
"Patient presents to clinic today to discuss depression. He states everything just \"got\" him this year.     No LMP for male patient.  Medication Reconciliation: complete    Haylie Lorenzo LPN  9/9/2019 1:36 PM    "

## 2019-09-09 NOTE — PROGRESS NOTES
"Subjective     Marbin Adams is a 69 year old male who presents to clinic today for the following health issues:    HPI   Abnormal Mood Symptoms  Onset: has always had more difficulty in the fall, though this year has been exceptionally difficult. Current episode has been about the last 2 weeks that has been extremely difficult.     Description:   Depression: YES  Anxiety: YES    Accompanying Signs & Symptoms:  Still participating in activities that you used to enjoy: YES- not as often, usually once or twice a week though used to go 3-4 times per week  Fatigue: no  Irritability: no  Difficulty concentrating: no  Changes in appetite: YES- not as hungry  Problems with sleep: YES- I wear myself out because I am always keeping myself busy so when I go to bed I am tired  Heart racing/beating fast : no  Thoughts of hurting yourself or others: none    History:   Recent stress: YES- is dealing with increased stress from a few areas of life. Biggest issue he is dealing with is the current status of his son's marriage. His son and his wife have never had kids, work in the law enforcement field, and are having difficulties in the marriage. His DIL is reportedly \"running around\" in the marriage and his son is trying so very hard to maintain the marriage. Marbin has been his sounding board, often talking to him 4-5 times per day as he tries to work through the situation. This has put significantly more stress on Marbin and he is having difficulty managing his symptoms at this time. In addition to the difficulty with his son's marriage, he and his wife do not have any grandchildren which has been proving to be more bothersome to him than he thought it would be. All of their friends have multiple grandchildren and they are feeling very left out when discuss turns to the grandkids. He does have another son, though states he is not likely going to have children as he is malin and happily , though they have never discussed kids. " Another significant issue he is dealing with at this time is that he is losing 2 of his closest friends - 1 is moving out of state and the other has many significant health concerns and is not expected to live much longer. He has been very close with these 2 guys for many many years and is finding it hard to accept they will no longer be here. He additionally states that he often has a harder time in the fall as the seasons change, though notes that it is often very short lived and he certainly enjoys spending time outdoors in the winter as well.   Family history of depression: no  Family history of anxiety: no    Precipitating factors:   Alcohol/drug use: no - rare alcohol use, one beer every few weeks or more    Alleviating factors:  Keeping himself busy    Therapies Tried and outcome: None    Patient Active Problem List   Diagnosis     Diverticulosis of colon     Dyslipidemia     Elevated PSA     Obesity     Family history of prostate cancer     Health care maintenance     Essential hypertension, benign     Prostate nodule     H/O adenomatous polyp of colon     Past Surgical History:   Procedure Laterality Date     CLOSED REDUCTION ANKLE      ,right     COLONOSCOPY      ,Due      COLONOSCOPY  01/15/2018    01/15/2018,F/U      OTHER SURGICAL HISTORY      2017,[id number],PROSTATE BIOPSY,Negative     TONSILLECTOMY             Social History     Tobacco Use     Smoking status: Never Smoker     Smokeless tobacco: Never Used   Substance Use Topics     Alcohol use: Yes     Alcohol/week: 0.5 oz     Comment: Alcoholic Drinks/day: occasionally     Family History   Problem Relation Age of Onset     Other - See Comments Mother         Hx of TIAs     Other - See Comments Father         Dementia     Prostate Cancer Brother         Cancer-prostate     Other - See Comments Brother         Skin cancer     Other - See Comments Sister          as an infant         Current Outpatient Medications   Medication  "Sig Dispense Refill     atenolol (TENORMIN) 100 MG tablet Take 1 tablet (100 mg) by mouth daily 90 tablet 3     atorvastatin (LIPITOR) 40 MG tablet Take 1 tablet (40 mg) by mouth daily 90 tablet 3     DOCOSAHEXAENOIC ACID PO Take 1 capsule by mouth daily       hydrochlorothiazide (HYDRODIURIL) 25 MG tablet Take 1 tablet (25 mg) by mouth daily 90 tablet 3     lisinopril (PRINIVIL/ZESTRIL) 10 MG tablet Take 1 tablet (10 mg) by mouth daily 90 tablet 3     Multiple Vitamins-Minerals (THERA-M) TABS Take 1 tablet by mouth daily       Allergies   Allergen Reactions     Sulfa Drugs Nausea and Vomiting       Reviewed and updated as needed this visit by Provider         Review of Systems   ROS COMP: CONSTITUTIONAL:NEGATIVE for fever, chills, change in weight  RESP:NEGATIVE for significant cough or SOB  CV: NEGATIVE for chest pain, palpitations or peripheral edema  NEURO: NEGATIVE for weakness, dizziness or paresthesias  PSYCHIATRIC: POSITIVE for as HPI      Objective    /80 (BP Location: Right arm, Patient Position: Sitting, Cuff Size: Adult Regular)   Pulse 63   Temp 96.8  F (36  C) (Tympanic)   Resp 16   Ht 1.791 m (5' 10.5\")   Wt 102.1 kg (225 lb 2 oz)   SpO2 98%   BMI 31.85 kg/m    Body mass index is 31.85 kg/m .  Physical Exam   GENERAL: healthy, alert and no distress  NECK: no adenopathy, no asymmetry, masses, or scars and thyroid normal to palpation  RESP: lungs clear to auscultation - no rales, rhonchi or wheezes  CV: regular rate and rhythm, normal S1 S2, no S3 or S4, no murmur, click or rub, no peripheral edema and peripheral pulses strong  SKIN: no suspicious lesions or rashes  NEURO: Normal strength and tone, mentation intact and speech normal  PSYCH: mentation appears normal, affect normal/bright    Diagnostic Test Results:  Labs reviewed in Epic  none         Assessment & Plan       ICD-10-CM    1. Anxiety F41.9 hydrOXYzine (VISTARIL) 50 MG capsule     Discussed options for treatment at this time, " including exercise, meditation, journaling, counseling/therapy, medications. Ultimately decided upon PRN vistaril for periods of increased anxiety, yoga and meditation. Highly encouraged him to consider counseling to help him get through the significant issues he is dealing with at this time, declined. Follow up in clinic in 2 weeks for medication check.      Chantel Herr NP  [facility] AND [facility]

## 2019-09-10 ASSESSMENT — ANXIETY QUESTIONNAIRES: GAD7 TOTAL SCORE: 11

## 2019-09-13 DIAGNOSIS — I10 ESSENTIAL HYPERTENSION, BENIGN: ICD-10-CM

## 2019-09-13 DIAGNOSIS — E78.5 DYSLIPIDEMIA: ICD-10-CM

## 2019-09-13 RX ORDER — HYDROCHLOROTHIAZIDE 25 MG/1
25 TABLET ORAL DAILY
Qty: 90 TABLET | Refills: 0 | Status: SHIPPED | OUTPATIENT
Start: 2019-09-13 | End: 2019-12-20

## 2019-09-13 RX ORDER — ATORVASTATIN CALCIUM 40 MG/1
40 TABLET, FILM COATED ORAL DAILY
Qty: 90 TABLET | Refills: 0 | Status: SHIPPED | OUTPATIENT
Start: 2019-09-13 | End: 2019-12-20

## 2019-09-13 RX ORDER — LISINOPRIL 10 MG/1
10 TABLET ORAL DAILY
Qty: 90 TABLET | Refills: 0 | Status: SHIPPED | OUTPATIENT
Start: 2019-09-13 | End: 2019-12-20

## 2019-09-13 RX ORDER — ATENOLOL 100 MG/1
100 TABLET ORAL DAILY
Qty: 90 TABLET | Refills: 0 | Status: SHIPPED | OUTPATIENT
Start: 2019-09-13 | End: 2019-12-20

## 2019-09-13 NOTE — TELEPHONE ENCOUNTER
"Requested Prescriptions   Pending Prescriptions Disp Refills     atenolol (TENORMIN) 100 MG tablet [Pharmacy Med Name: ATENOLOL 100MG TABLET] 90 tablet 3     Sig: TAKE 1 TABLET (100 MG) BY MOUTH DAILY       Beta-Blockers Protocol Passed - 9/13/2019 11:45 AM        Passed - Blood pressure under 140/90 in past 12 months     BP Readings from Last 3 Encounters:   09/09/19 118/80   07/25/19 128/72   12/14/18 126/76                 Passed - Patient is age 6 or older        Passed - Recent (12 mo) or future (30 days) visit within the authorizing provider's specialty     Patient had office visit in the last 12 months or has a visit in the next 30 days with authorizing provider or within the authorizing provider's specialty.  See \"Patient Info\" tab in inbasket, or \"Choose Columns\" in Meds & Orders section of the refill encounter.              Passed - Medication is active on med list        atorvastatin (LIPITOR) 40 MG tablet [Pharmacy Med Name: ATORVASTATIN 40MG TABLET] 90 tablet 3     Sig: TAKE 1 TABLET (40 MG) BY MOUTH DAILY       Statins Protocol Passed - 9/13/2019 11:45 AM        Passed - LDL on file in past 12 months     Recent Labs   Lab Test 12/14/18  0904   LDL 69             Passed - No abnormal creatine kinase in past 12 months     No lab results found.             Passed - Recent (12 mo) or future (30 days) visit within the authorizing provider's specialty     Patient had office visit in the last 12 months or has a visit in the next 30 days with authorizing provider or within the authorizing provider's specialty.  See \"Patient Info\" tab in inbasket, or \"Choose Columns\" in Meds & Orders section of the refill encounter.              Passed - Medication is active on med list        Passed - Patient is age 18 or older        lisinopril (PRINIVIL/ZESTRIL) 10 MG tablet [Pharmacy Med Name: LISINOPRIL 10MG TABLET] 90 tablet 3     Sig: TAKE 1 TABLET (10 MG) BY MOUTH DAILY       ACE Inhibitors (Including Combos) Protocol " "Passed - 9/13/2019 11:45 AM        Passed - Blood pressure under 140/90 in past 12 months     BP Readings from Last 3 Encounters:   09/09/19 118/80   07/25/19 128/72   12/14/18 126/76                 Passed - Recent (12 mo) or future (30 days) visit within the authorizing provider's specialty     Patient had office visit in the last 12 months or has a visit in the next 30 days with authorizing provider or within the authorizing provider's specialty.  See \"Patient Info\" tab in inbasket, or \"Choose Columns\" in Meds & Orders section of the refill encounter.              Passed - Medication is active on med list        Passed - Patient is age 18 or older        Passed - Normal serum creatinine on file in past 12 months     Recent Labs   Lab Test 12/14/18  0904   CR 0.90             Passed - Normal serum potassium on file in past 12 months     Recent Labs   Lab Test 12/14/18  0904   POTASSIUM 4.1             hydrochlorothiazide (HYDRODIURIL) 25 MG tablet [Pharmacy Med Name: HYDROCHLOROTHIAZIDE 25MG TAB] 90 tablet 3     Sig: TAKE 1 TABLET (25 MG) BY MOUTH DAILY       Diuretics (Including Combos) Protocol Passed - 9/13/2019 11:45 AM        Passed - Blood pressure under 140/90 in past 12 months     BP Readings from Last 3 Encounters:   09/09/19 118/80   07/25/19 128/72   12/14/18 126/76                 Passed - Recent (12 mo) or future (30 days) visit within the authorizing provider's specialty     Patient had office visit in the last 12 months or has a visit in the next 30 days with authorizing provider or within the authorizing provider's specialty.  See \"Patient Info\" tab in inbasket, or \"Choose Columns\" in Meds & Orders section of the refill encounter.              Passed - Medication is active on med list        Passed - Patient is age 18 or older        Passed - Normal serum creatinine on file in past 12 months     Recent Labs   Lab Test 12/14/18  0904   CR 0.90              Passed - Normal serum potassium on file in " past 12 months     Recent Labs   Lab Test 12/14/18  0904   POTASSIUM 4.1                    Passed - Normal serum sodium on file in past 12 months     Recent Labs   Lab Test 12/14/18  0904                 lov 12/14/18  Prescription approved per [facility] Refill Protocol.

## 2019-09-24 ENCOUNTER — OFFICE VISIT (OUTPATIENT)
Dept: FAMILY MEDICINE | Facility: OTHER | Age: 69
End: 2019-09-24
Attending: FAMILY MEDICINE
Payer: COMMERCIAL

## 2019-09-24 VITALS
OXYGEN SATURATION: 97 % | HEART RATE: 68 BPM | DIASTOLIC BLOOD PRESSURE: 66 MMHG | WEIGHT: 229 LBS | SYSTOLIC BLOOD PRESSURE: 124 MMHG | RESPIRATION RATE: 16 BRPM | TEMPERATURE: 98.9 F | BODY MASS INDEX: 32.39 KG/M2

## 2019-09-24 DIAGNOSIS — F41.9 ANXIETY: ICD-10-CM

## 2019-09-24 DIAGNOSIS — F43.22 ADJUSTMENT DISORDER WITH ANXIOUS MOOD: Primary | ICD-10-CM

## 2019-09-24 PROCEDURE — G0463 HOSPITAL OUTPT CLINIC VISIT: HCPCS

## 2019-09-24 PROCEDURE — 99213 OFFICE O/P EST LOW 20 MIN: CPT | Performed by: FAMILY MEDICINE

## 2019-09-24 RX ORDER — HYDROXYZINE PAMOATE 50 MG/1
50 CAPSULE ORAL 4 TIMES DAILY PRN
Qty: 30 CAPSULE | Refills: 3 | Status: SHIPPED | OUTPATIENT
Start: 2019-09-24 | End: 2019-12-20

## 2019-09-24 ASSESSMENT — ANXIETY QUESTIONNAIRES
3. WORRYING TOO MUCH ABOUT DIFFERENT THINGS: NOT AT ALL
5. BEING SO RESTLESS THAT IT IS HARD TO SIT STILL: NOT AT ALL
7. FEELING AFRAID AS IF SOMETHING AWFUL MIGHT HAPPEN: NOT AT ALL
GAD7 TOTAL SCORE: 0
2. NOT BEING ABLE TO STOP OR CONTROL WORRYING: NOT AT ALL
1. FEELING NERVOUS, ANXIOUS, OR ON EDGE: NOT AT ALL
6. BECOMING EASILY ANNOYED OR IRRITABLE: NOT AT ALL
IF YOU CHECKED OFF ANY PROBLEMS ON THIS QUESTIONNAIRE, HOW DIFFICULT HAVE THESE PROBLEMS MADE IT FOR YOU TO DO YOUR WORK, TAKE CARE OF THINGS AT HOME, OR GET ALONG WITH OTHER PEOPLE: NOT DIFFICULT AT ALL

## 2019-09-24 ASSESSMENT — ENCOUNTER SYMPTOMS
FATIGUE: 0
HEADACHES: 0
DYSPHORIC MOOD: 0
NERVOUS/ANXIOUS: 0

## 2019-09-24 ASSESSMENT — PAIN SCALES - GENERAL: PAINLEVEL: NO PAIN (0)

## 2019-09-24 ASSESSMENT — PATIENT HEALTH QUESTIONNAIRE - PHQ9
SUM OF ALL RESPONSES TO PHQ QUESTIONS 1-9: 0
5. POOR APPETITE OR OVEREATING: NOT AT ALL

## 2019-09-24 NOTE — PROGRESS NOTES
SUBJECTIVE:   Marbin Adams is a 69 year old male who presents to clinic today for the following health issues:    HPI  Follow up anxiety.  Lots of stress at home.  Son is an officer,  to another who is having an affair.  They are  but living in the same house.  The son has been opening up to him.    Another son is malin, but very happy in a relationship.  As 2 friends who are also going to be moving away.  Anxiety related to all of this.  Was seen 2 weeks ago, got vistaril and says it worked really well.  For the last 5 days has been feeling normal again.  He has never needed more than one a day.  Walks and lifts weights in the AM, helps settle his brain down.  Has had anxiety his whole life, worse in this time of the year. Hated returning to school even in middle school.    Patient Active Problem List    Diagnosis Date Noted     Adjustment disorder with anxious mood 09/24/2019     Priority: Medium     Obesity 01/31/2018     Priority: Medium     H/O adenomatous polyp of colon 01/15/2018     Priority: Medium     Health care maintenance 01/04/2018     Priority: Medium     Elevated PSA 07/14/2017     Priority: Medium     Family history of prostate cancer 06/03/2015     Priority: Medium     Prostate nodule 11/20/2014     Priority: Medium     Dyslipidemia 10/31/2013     Priority: Medium     Diverticulosis of colon 12/03/2007     Priority: Medium     Essential hypertension, benign 04/01/2006     Priority: Medium     Past Surgical History:   Procedure Laterality Date     CLOSED REDUCTION ANKLE      1962,right     COLONOSCOPY      2007,Due 2017     COLONOSCOPY  01/15/2018    01/15/2018,F/U 2023     OTHER SURGICAL HISTORY      07/2017,LRA857,PROSTATE BIOPSY,Negative     TONSILLECTOMY      1957     Social History     Tobacco Use     Smoking status: Never Smoker     Smokeless tobacco: Never Used   Substance Use Topics     Alcohol use: Yes     Alcohol/week: 0.8 standard drinks     Comment: Alcoholic Drinks/day:  occasionally     Current Outpatient Medications   Medication Sig Dispense Refill     atenolol (TENORMIN) 100 MG tablet TAKE 1 TABLET (100 MG) BY MOUTH DAILY 90 tablet 0     atorvastatin (LIPITOR) 40 MG tablet TAKE 1 TABLET (40 MG) BY MOUTH DAILY 90 tablet 0     DOCOSAHEXAENOIC ACID PO Take 1 capsule by mouth daily       hydrochlorothiazide (HYDRODIURIL) 25 MG tablet TAKE 1 TABLET (25 MG) BY MOUTH DAILY 90 tablet 0     hydrOXYzine (VISTARIL) 50 MG capsule Take 1 capsule (50 mg) by mouth 4 times daily as needed for anxiety 30 capsule 0     lisinopril (PRINIVIL/ZESTRIL) 10 MG tablet TAKE 1 TABLET (10 MG) BY MOUTH DAILY 90 tablet 0     Multiple Vitamins-Minerals (THERA-M) TABS Take 1 tablet by mouth daily       Allergies   Allergen Reactions     Sulfa Drugs Nausea and Vomiting       Review of Systems   Constitutional: Negative for fatigue.   Neurological: Negative for headaches.   Psychiatric/Behavioral: Negative for dysphoric mood. The patient is not nervous/anxious.         OBJECTIVE:     /66 (BP Location: Right arm, Patient Position: Sitting, Cuff Size: Adult Large)   Pulse 68   Temp 98.9  F (37.2  C) (Tympanic)   Resp 16   Wt 103.9 kg (229 lb)   SpO2 97%   BMI 32.39 kg/m    Body mass index is 32.39 kg/m .  Physical Exam  Constitutional:       Appearance: Normal appearance.   Neurological:      General: No focal deficit present.      Mental Status: He is alert and oriented to person, place, and time.   Psychiatric:         Mood and Affect: Mood normal.         Behavior: Behavior normal.         Thought Content: Thought content normal.         Judgement: Judgment normal.         Diagnostic Test Results:  none     ASSESSMENT/PLAN:       (F43.22) Adjustment disorder with anxious mood  (primary encounter diagnosis)  Comment: appears to be improved and currently is not taking meds.  Is worried it will come back.  Discussed with him counseling in addition to long term meds.  He really just wants a refill on the  vistaril.  Plan:      (F41.9) Anxiety  Comment:    Plan: hydrOXYzine (VISTARIL) 50 MG capsule           15 minutes with him, over half in counseling.          Darin Pappas MD  [facility] AND [facility]

## 2019-09-24 NOTE — NURSING NOTE
"Coming in for a f/u on Anxiety medication    Chief Complaint   Patient presents with     Anxiety     F/U       Initial /66 (BP Location: Right arm, Patient Position: Sitting, Cuff Size: Adult Large)   Pulse 68   Temp 98.9  F (37.2  C) (Tympanic)   Resp 16   Wt 103.9 kg (229 lb)   SpO2 97%   BMI 32.39 kg/m   Estimated body mass index is 32.39 kg/m  as calculated from the following:    Height as of 9/9/19: 1.791 m (5' 10.5\").    Weight as of this encounter: 103.9 kg (229 lb).  Medication Reconciliation: complete    Latasha Juarez LPN  "

## 2019-09-25 ASSESSMENT — ANXIETY QUESTIONNAIRES: GAD7 TOTAL SCORE: 0

## 2019-10-25 ENCOUNTER — ALLIED HEALTH/NURSE VISIT (OUTPATIENT)
Dept: FAMILY MEDICINE | Facility: OTHER | Age: 69
End: 2019-10-25
Payer: MEDICARE

## 2019-10-25 DIAGNOSIS — Z23 NEED FOR PROPHYLACTIC VACCINATION AND INOCULATION AGAINST INFLUENZA: Primary | ICD-10-CM

## 2019-10-25 PROCEDURE — 90471 IMMUNIZATION ADMIN: CPT

## 2019-10-25 PROCEDURE — 90662 IIV NO PRSV INCREASED AG IM: CPT

## 2019-12-20 ENCOUNTER — OFFICE VISIT (OUTPATIENT)
Dept: FAMILY MEDICINE | Facility: OTHER | Age: 69
End: 2019-12-20
Attending: FAMILY MEDICINE
Payer: COMMERCIAL

## 2019-12-20 VITALS
RESPIRATION RATE: 16 BRPM | DIASTOLIC BLOOD PRESSURE: 64 MMHG | HEIGHT: 72 IN | HEART RATE: 62 BPM | WEIGHT: 230.2 LBS | TEMPERATURE: 98.6 F | OXYGEN SATURATION: 99 % | SYSTOLIC BLOOD PRESSURE: 118 MMHG | BODY MASS INDEX: 31.18 KG/M2

## 2019-12-20 DIAGNOSIS — I10 ESSENTIAL HYPERTENSION, BENIGN: ICD-10-CM

## 2019-12-20 DIAGNOSIS — N40.2 PROSTATE NODULE: ICD-10-CM

## 2019-12-20 DIAGNOSIS — E78.5 DYSLIPIDEMIA: ICD-10-CM

## 2019-12-20 DIAGNOSIS — F41.9 ANXIETY: ICD-10-CM

## 2019-12-20 DIAGNOSIS — Z00.00 ENCOUNTER FOR MEDICARE ANNUAL WELLNESS EXAM: Primary | ICD-10-CM

## 2019-12-20 LAB
ANION GAP SERPL CALCULATED.3IONS-SCNC: 6 MMOL/L (ref 3–14)
BUN SERPL-MCNC: 21 MG/DL (ref 7–25)
CALCIUM SERPL-MCNC: 10 MG/DL (ref 8.6–10.3)
CHLORIDE SERPL-SCNC: 100 MMOL/L (ref 98–107)
CHOLEST SERPL-MCNC: 103 MG/DL
CO2 SERPL-SCNC: 33 MMOL/L (ref 21–31)
CREAT SERPL-MCNC: 0.99 MG/DL (ref 0.7–1.3)
GFR SERPL CREATININE-BSD FRML MDRD: 75 ML/MIN/{1.73_M2}
GLUCOSE SERPL-MCNC: 105 MG/DL (ref 70–105)
HDLC SERPL-MCNC: 28 MG/DL (ref 23–92)
LDLC SERPL CALC-MCNC: 35 MG/DL
NONHDLC SERPL-MCNC: 75 MG/DL
POTASSIUM SERPL-SCNC: 3.7 MMOL/L (ref 3.5–5.1)
PSA SERPL-MCNC: 5.08 NG/ML
SODIUM SERPL-SCNC: 139 MMOL/L (ref 134–144)
TRIGL SERPL-MCNC: 201 MG/DL

## 2019-12-20 PROCEDURE — 80048 BASIC METABOLIC PNL TOTAL CA: CPT | Mod: ZL | Performed by: FAMILY MEDICINE

## 2019-12-20 PROCEDURE — 36415 COLL VENOUS BLD VENIPUNCTURE: CPT | Mod: ZL | Performed by: FAMILY MEDICINE

## 2019-12-20 PROCEDURE — 80061 LIPID PANEL: CPT | Mod: ZL | Performed by: FAMILY MEDICINE

## 2019-12-20 PROCEDURE — G0439 PPPS, SUBSEQ VISIT: HCPCS | Performed by: FAMILY MEDICINE

## 2019-12-20 PROCEDURE — 84153 ASSAY OF PSA TOTAL: CPT | Mod: ZL | Performed by: FAMILY MEDICINE

## 2019-12-20 PROCEDURE — G0463 HOSPITAL OUTPT CLINIC VISIT: HCPCS

## 2019-12-20 RX ORDER — HYDROXYZINE PAMOATE 50 MG/1
50 CAPSULE ORAL 4 TIMES DAILY PRN
Qty: 30 CAPSULE | Refills: 3 | Status: SHIPPED | OUTPATIENT
Start: 2019-12-20 | End: 2020-09-14

## 2019-12-20 RX ORDER — HYDROCHLOROTHIAZIDE 25 MG/1
25 TABLET ORAL DAILY
Qty: 90 TABLET | Refills: 3 | Status: SHIPPED | OUTPATIENT
Start: 2019-12-20 | End: 2020-11-16

## 2019-12-20 RX ORDER — ATORVASTATIN CALCIUM 40 MG/1
40 TABLET, FILM COATED ORAL DAILY
Qty: 90 TABLET | Refills: 3 | Status: SHIPPED | OUTPATIENT
Start: 2019-12-20 | End: 2021-02-16

## 2019-12-20 RX ORDER — ATENOLOL 100 MG/1
100 TABLET ORAL DAILY
Qty: 90 TABLET | Refills: 3 | Status: SHIPPED | OUTPATIENT
Start: 2019-12-20 | End: 2020-11-16

## 2019-12-20 RX ORDER — LISINOPRIL 10 MG/1
10 TABLET ORAL DAILY
Qty: 90 TABLET | Refills: 3 | Status: SHIPPED | OUTPATIENT
Start: 2019-12-20 | End: 2020-11-16

## 2019-12-20 ASSESSMENT — ANXIETY QUESTIONNAIRES
IF YOU CHECKED OFF ANY PROBLEMS ON THIS QUESTIONNAIRE, HOW DIFFICULT HAVE THESE PROBLEMS MADE IT FOR YOU TO DO YOUR WORK, TAKE CARE OF THINGS AT HOME, OR GET ALONG WITH OTHER PEOPLE: NOT DIFFICULT AT ALL
1. FEELING NERVOUS, ANXIOUS, OR ON EDGE: NOT AT ALL
GAD7 TOTAL SCORE: 0
7. FEELING AFRAID AS IF SOMETHING AWFUL MIGHT HAPPEN: NOT AT ALL
3. WORRYING TOO MUCH ABOUT DIFFERENT THINGS: NOT AT ALL
5. BEING SO RESTLESS THAT IT IS HARD TO SIT STILL: NOT AT ALL
6. BECOMING EASILY ANNOYED OR IRRITABLE: NOT AT ALL
2. NOT BEING ABLE TO STOP OR CONTROL WORRYING: NOT AT ALL

## 2019-12-20 ASSESSMENT — MIFFLIN-ST. JEOR: SCORE: 1839.24

## 2019-12-20 ASSESSMENT — PATIENT HEALTH QUESTIONNAIRE - PHQ9: 5. POOR APPETITE OR OVEREATING: NOT AT ALL

## 2019-12-20 ASSESSMENT — PAIN SCALES - GENERAL: PAINLEVEL: NO PAIN (0)

## 2019-12-20 NOTE — NURSING NOTE
"Coming in for a physical check up    Chief Complaint   Patient presents with     Physical     check up and medication refill       Initial /64   Pulse 62   Temp 98.6  F (37  C) (Tympanic)   Resp 16   Ht 1.816 m (5' 11.5\")   Wt 104.4 kg (230 lb 3.2 oz)   SpO2 99%   BMI 31.66 kg/m   Estimated body mass index is 31.66 kg/m  as calculated from the following:    Height as of this encounter: 1.816 m (5' 11.5\").    Weight as of this encounter: 104.4 kg (230 lb 3.2 oz).  Medication Reconciliation: complete    Latasha Juarez LPN  "

## 2019-12-20 NOTE — PATIENT INSTRUCTIONS
Patient Education   Personalized Prevention Plan  You are due for the preventive services outlined below.  Your care team is available to assist you in scheduling these services.  If you have already completed any of these items, please share that information with your care team to update in your medical record.  Health Maintenance Due   Topic Date Due     Hepatitis C Screening  1950     AORTIC ANEURYSM SCREENING (SYSTEM ASSIGNED)  06/16/2015     Diptheria Tetanus Pertussis (DTAP/TDAP/TD) Vaccine (2 - Td) 09/16/2019     FALL RISK ASSESSMENT  12/14/2019     Annual Wellness Visit  12/14/2019

## 2019-12-20 NOTE — PROGRESS NOTES
"  SUBJECTIVE:   Marbin Adams is a 69 year old male who presents for Preventive Visit.      Are you in the first 12 months of your Medicare Part B coverage?  No    Physical Health:    In general, how would you rate your overall physical health? good    Outside of work, how many days during the week do you exercise? 6-7 days/week    Outside of work, approximately how many minutes a day do you exercise?45-60 minutes    If you drink alcohol do you typically have >3 drinks per day or >7 drinks per week? No    Do you usually eat at least 4 servings of fruit and vegetables a day, include whole grains & fiber and avoid regularly eating high fat or \"junk\" foods? Yes    Do you have any problems taking medications regularly?  No    Do you have any side effects from medications? none    Needs assistance for the following daily activities: no assistance needed    Which of the following safety concerns are present in your home?  none identified     Hearing impairment: No    In the past 6 months, have you been bothered by leaking of urine? no    Mental Health:    In general, how would you rate your overall mental or emotional health? good  PHQ-2 Score: 0    Do you feel safe in your environment? Yes    Have you ever done Advance Care Planning? (For example, a Health Directive, POLST, or a discussion with a medical provider or your loved ones about your wishes): No, advance care planning information given to patient to review.  Patient declined advance care planning discussion at this time.    Additional concerns to address?  No    Fall risk:  Fallen 2 or more times in the past year?: No  Any fall with injury in the past year?: No    Cognitive Screenin) Repeat 3 items (Leader, Season, Table)      2) Clock draw:  NORMAL  3) 3 item recall:  Recalls 3 objects  Results: 3 items recalled: COGNITIVE IMPAIRMENT LESS LIKELY    Mini-CogTM Copyright S [employer]. Licensed by the author for use in [facility]; reprinted with " permission (nayeli@[facility]). All rights reserved.      Do you have sleep apnea, excessive snoring or daytime drowsiness?: no             Reviewed and updated as needed this visit by clinical staff  Tobacco  Allergies  Meds  Med Hx  Soc Hx        Reviewed and updated as needed this visit by Provider        Social History     Tobacco Use     Smoking status: Never Smoker     Smokeless tobacco: Never Used   Substance Use Topics     Alcohol use: Yes     Alcohol/week: 0.8 standard drinks     Comment: Alcoholic Drinks/day: occasionally                           Current providers sharing in care for this patient include:   Patient Care Team:  Darin Pappas MD as PCP - General (Family Practice)  Darin Pappas MD as Assigned PCP    The following health maintenance items are reviewed in Epic and correct as of today:  Health Maintenance   Topic Date Due     HEPATITIS C SCREENING  1950     AORTIC ANEURYSM SCREENING (SYSTEM ASSIGNED)  06/16/2015     DTAP/TDAP/TD IMMUNIZATION (2 - Td) 09/16/2019     FALL RISK ASSESSMENT  12/14/2019     MEDICARE ANNUAL WELLNESS VISIT  12/14/2019     COLONOSCOPY  01/15/2023     ADVANCE CARE PLANNING  02/19/2023     LIPID  12/14/2023     PHQ-2  Completed     INFLUENZA VACCINE  Completed     PNEUMOCOCCAL IMMUNIZATION 65+ LOW/MEDIUM RISK  Completed     ZOSTER IMMUNIZATION  Completed     IPV IMMUNIZATION  Aged Out     MENINGITIS IMMUNIZATION  Aged Out     Lab work is in process  Labs reviewed in EPIC  Current Outpatient Medications   Medication Sig Dispense Refill     atenolol (TENORMIN) 100 MG tablet Take 1 tablet (100 mg) by mouth daily 90 tablet 3     atorvastatin (LIPITOR) 40 MG tablet Take 1 tablet (40 mg) by mouth daily 90 tablet 3     DOCOSAHEXAENOIC ACID PO Take 1 capsule by mouth daily       hydrochlorothiazide (HYDRODIURIL) 25 MG tablet Take 1 tablet (25 mg) by mouth daily 90 tablet 3     hydrOXYzine (VISTARIL) 50 MG capsule Take 1 capsule (50 mg) by mouth 4 times daily as needed for  "anxiety 30 capsule 3     lisinopril (PRINIVIL/ZESTRIL) 10 MG tablet Take 1 tablet (10 mg) by mouth daily 90 tablet 3     Multiple Vitamins-Minerals (THERA-M) TABS Take 1 tablet by mouth daily       Allergies   Allergen Reactions     Sulfa Drugs Nausea and Vomiting          ROS:  Constitutional, HEENT, cardiovascular, pulmonary, GI, , musculoskeletal, neuro, skin, endocrine and psych systems are negative, except as otherwise noted.    OBJECTIVE:   /64   Pulse 62   Temp 98.6  F (37  C) (Tympanic)   Resp 16   Ht 1.816 m (5' 11.5\")   Wt 104.4 kg (230 lb 3.2 oz)   SpO2 99%   BMI 31.66 kg/m   Estimated body mass index is 31.66 kg/m  as calculated from the following:    Height as of this encounter: 1.816 m (5' 11.5\").    Weight as of this encounter: 104.4 kg (230 lb 3.2 oz).  EXAM:   GENERAL: healthy, alert and no distress  EYES: Eyes grossly normal to inspection, PERRL and conjunctivae and sclerae normal  HENT: ear canals and TM's normal, nose and mouth without ulcers or lesions  NECK: no adenopathy, no asymmetry, masses, or scars and thyroid normal to palpation  RESP: lungs clear to auscultation - no rales, rhonchi or wheezes  CV: regular rate and rhythm, normal S1 S2, no S3 or S4, no murmur, click or rub, no peripheral edema and peripheral pulses strong  ABDOMEN: soft, nontender, no hepatosplenomegaly, no masses and bowel sounds normal  MS: no gross musculoskeletal defects noted, no edema  SKIN: no suspicious lesions or rashes  NEURO: Normal strength and tone, mentation intact and speech normal  PSYCH: mentation appears normal, affect normal/bright    Diagnostic Test Results:  Labs reviewed in Epic  Results for orders placed or performed in visit on 12/20/19   Prostate Specific Antigen     Status: Abnormal   Result Value Ref Range    Prostate Specific Antigen 5.082 (H) <3.100 ng/mL   Lipid Panel     Status: Abnormal   Result Value Ref Range    Cholesterol 103 <200 mg/dL    Triglycerides 201 (H) <150 mg/dL " "   HDL Cholesterol 28 23 - 92 mg/dL    LDL Cholesterol Calculated 35 <100 mg/dL    Non HDL Cholesterol 75 <130 mg/dL   Basic Metabolic Panel     Status: Abnormal   Result Value Ref Range    Sodium 139 134 - 144 mmol/L    Potassium 3.7 3.5 - 5.1 mmol/L    Chloride 100 98 - 107 mmol/L    Carbon Dioxide 33 (H) 21 - 31 mmol/L    Anion Gap 6 3 - 14 mmol/L    Glucose 105 70 - 105 mg/dL    Urea Nitrogen 21 7 - 25 mg/dL    Creatinine 0.99 0.70 - 1.30 mg/dL    GFR Estimate 75 >60 mL/min/[1.73_m2]    GFR Estimate If Black >90 >60 mL/min/[1.73_m2]    Calcium 10.0 8.6 - 10.3 mg/dL         ASSESSMENT / PLAN:       ICD-10-CM    1. Encounter for Medicare annual wellness exam Z00.00    2. Essential hypertension, benign I10 atenolol (TENORMIN) 100 MG tablet     lisinopril (PRINIVIL/ZESTRIL) 10 MG tablet     hydrochlorothiazide (HYDRODIURIL) 25 MG tablet     Basic Metabolic Panel   3. Dyslipidemia E78.5 atorvastatin (LIPITOR) 40 MG tablet     Lipid Panel   4. Anxiety F41.9 hydrOXYzine (VISTARIL) 50 MG capsule   5. Prostate nodule N40.2 Prostate Specific Antigen       COUNSELING:  Reviewed preventive health counseling, as reflected in patient instructions       Regular exercise       Healthy diet/nutrition       Fall risk prevention    Estimated body mass index is 31.66 kg/m  as calculated from the following:    Height as of this encounter: 1.816 m (5' 11.5\").    Weight as of this encounter: 104.4 kg (230 lb 3.2 oz).    Weight management plan: Discussed healthy diet and exercise guidelines     reports that he has never smoked. He has never used smokeless tobacco.      Appropriate preventive services were discussed with this patient, including applicable screening as appropriate for cardiovascular disease, diabetes, osteopenia/osteoporosis, and glaucoma.  As appropriate for age/gender, discussed screening for colorectal cancer, prostate cancer, breast cancer, and cervical cancer. Checklist reviewing preventive services available has " been given to the patient.    Reviewed patients plan of care and provided an AVS. The Basic Care Plan (routine screening as documented in Health Maintenance) for Marbin meets the Care Plan requirement. This Care Plan has been established and reviewed with the Patient.    Counseling Resources:  ATP IV Guidelines  Pooled Cohorts Equation Calculator  Breast Cancer Risk Calculator  FRAX Risk Assessment  ICSI Preventive Guidelines  Dietary Guidelines for Americans, 2010  USDA's MyPlate  ASA Prophylaxis  Lung CA Screening    Darin Pappas MD  [facility] AND [facility]

## 2019-12-21 ASSESSMENT — ANXIETY QUESTIONNAIRES: GAD7 TOTAL SCORE: 0

## 2020-06-23 DIAGNOSIS — R97.20 ELEVATED PSA: Primary | ICD-10-CM

## 2020-07-29 ENCOUNTER — OFFICE VISIT (OUTPATIENT)
Dept: UROLOGY | Facility: OTHER | Age: 70
End: 2020-07-29
Attending: UROLOGY
Payer: MEDICARE

## 2020-07-29 VITALS
SYSTOLIC BLOOD PRESSURE: 112 MMHG | RESPIRATION RATE: 16 BRPM | WEIGHT: 224 LBS | HEART RATE: 68 BPM | DIASTOLIC BLOOD PRESSURE: 62 MMHG | OXYGEN SATURATION: 99 % | BODY MASS INDEX: 30.81 KG/M2

## 2020-07-29 DIAGNOSIS — R97.20 ELEVATED PSA: ICD-10-CM

## 2020-07-29 DIAGNOSIS — R97.20 ELEVATED PSA: Primary | ICD-10-CM

## 2020-07-29 LAB — PSA SERPL-MCNC: 4.17 NG/ML

## 2020-07-29 PROCEDURE — 36415 COLL VENOUS BLD VENIPUNCTURE: CPT | Mod: ZL | Performed by: UROLOGY

## 2020-07-29 PROCEDURE — G0463 HOSPITAL OUTPT CLINIC VISIT: HCPCS

## 2020-07-29 PROCEDURE — 99213 OFFICE O/P EST LOW 20 MIN: CPT | Performed by: UROLOGY

## 2020-07-29 PROCEDURE — 84153 ASSAY OF PSA TOTAL: CPT | Mod: ZL | Performed by: UROLOGY

## 2020-07-29 ASSESSMENT — PAIN SCALES - GENERAL: PAINLEVEL: NO PAIN (0)

## 2020-07-29 NOTE — PROGRESS NOTES
Type of Visit  Established    Chief Complaint  Elevated PSA    HPI  Mr. Adams is a 70 y.o. male with history of negative prostate biopsy following up with elevated PSA.    He does have a family history of prostate cancer.  His brother was diagnosed 12 years ago in his 60s and underwent prostatectomy.  Today he denies unexplained weight loss, significant changes in urinary function, pelvic and bone pain.  He also denies any healthcare changes in the last year.  The most recent PSA was collected this morning and he follows up to review the results.      Review of Systems  I reviewed the ROS the patient today.    Nursing Notes:   Dominique Bautista LPN  2020 11:18 AM  Signed  Chief Complaint   Patient presents with     Follow Up     1 year elevated PSA   Patient presents to the clinic today for a 1 year follow up for an elevated PSA    Review of Systems:    Weight loss:    No     Recent fever/chills:  No   Night sweats:   No  Current skin rash:  No   Recent hair loss:  No  Heat intolerance:  No   Cold intolerance:  No  Chest pain:   No   Palpitations:   No  Shortness of breath:  No   Wheezing:   No  Constipation:    No   Diarrhea:   No   Nausea:   No   Vomiting:   No   Kidney/side pain:  No   Back pain:   No  Frequent headaches:  No   Dizziness:     No  Leg swelling:   No   Calf pain:    No          Medication Reconciliation: completed   Dominique Bautista LPN  2020 11:13 AM     Family History  Family History   Problem Relation Age of Onset     Other Mother      Hx of TIAs     Other Father      Dementia     Cancer-prostate Brother      Other Brother      Skin cancer     Other Sister       as an infant       Physical Exam  /62 (BP Location: Right arm, Patient Position: Sitting, Cuff Size: Adult Large)   Pulse 68   Resp 16   Wt 101.6 kg (224 lb)   SpO2 99%   BMI 30.81 kg/m    Constitutional: NAD, WDWN.  Cardiovascular: Regular rate.  Pulmonary/Chest: Respirations are even and non-labored  bilaterally.  Abdominal: Soft. No distension, tenderness, masses or guarding. No CVA tenderness.  Extremities: CAROL x 4, Warm. No clubbing.  No cyanosis.    Skin: Pink, warm and dry.  No rashes noted.  Prostate:   Normal rectal tone, 40-50 grams.      Symmetric, non-tender, anodular and no induration - stable    Labs  Results for ANNITA ADAMS (MRN [id number]) as of 7/29/2020 11:18   7/25/2019 08:07 12/20/2019 14:17 7/29/2020 09:04   PSA 5.462 (H) 5.082 (H) 4.174 (H)     Results for ANNITA ADAMS (MRN [id number]) as of 7/25/2019 10:48   1/16/2018 08:50 7/23/2018 07:08   PSA 5.221 (H) 2.817     Results for ANNITA ADAMS (MRN [id number]) as of 1/18/2018 09:55   11/20/2014 11:26 5/22/2015 07:32 6/13/2016 09:27 6/29/2017 07:50   PSA 1.580 2.212 1.769 4.430 (H)     Pathology  7/3/2017  Negative 12 cores out of 12 cores  Calculated prostate volume based on TRUS: 55 grams    Assessment  Mr. Adams is a 70 y.o. male with history of negative prostate biopsy following up with elevated PSA.  Recent PSA continues to be stable.    Plan  Follow up in 1 year with PSA.

## 2020-07-29 NOTE — NURSING NOTE
Chief Complaint   Patient presents with     Follow Up     1 year elevated PSA   Patient presents to the clinic today for a 1 year follow up for an elevated PSA    Review of Systems:    Weight loss:    No     Recent fever/chills:  No   Night sweats:   No  Current skin rash:  No   Recent hair loss:  No  Heat intolerance:  No   Cold intolerance:  No  Chest pain:   No   Palpitations:   No  Shortness of breath:  No   Wheezing:   No  Constipation:    No   Diarrhea:   No   Nausea:   No   Vomiting:   No   Kidney/side pain:  No   Back pain:   No  Frequent headaches:  No   Dizziness:     No  Leg swelling:   No   Calf pain:    No          Medication Reconciliation: completed   Dominique Bautista LPN  7/29/2020 11:13 AM

## 2020-09-14 ENCOUNTER — TELEPHONE (OUTPATIENT)
Dept: FAMILY MEDICINE | Facility: OTHER | Age: 70
End: 2020-09-14

## 2020-09-14 DIAGNOSIS — F41.9 ANXIETY: Primary | ICD-10-CM

## 2020-09-14 RX ORDER — HYDROXYZINE HYDROCHLORIDE 25 MG/1
25 TABLET, FILM COATED ORAL 4 TIMES DAILY PRN
Qty: 128 TABLET | Refills: 11 | Status: SHIPPED | OUTPATIENT
Start: 2020-09-14 | End: 2022-06-17

## 2020-09-14 RX ORDER — HYDROXYZINE HYDROCHLORIDE 25 MG/1
25 TABLET, FILM COATED ORAL 4 TIMES DAILY PRN
COMMUNITY
End: 2020-09-14

## 2020-09-14 NOTE — TELEPHONE ENCOUNTER
We received a fax from [employer] on Behalf of [facility] denying the RX PA Request that was submitted for hydrOXYzine (VISTARIL) 50 MG capsule stating that this patients plan does not allow coverage of this medication based on us answering No to the following questions:    The American Geriatrics Society identifies the use of this medication as potentially inappropriate in older adults, meaning it is best avoided, prescribed at reduced dosage, or used with caution or carefully monitored.  Has the patient tried two of the following alternative drugs: buspirone, duloxetine, escitalopram, sertraline or venlafaxine extended-release?    Is the requested drug being prescribed for the treatment of acute anxiety?    For an Expedited Appeal, Call:  [phone]    For a Standard Appeal, Fax to:  [phone]  OR Call:  [phone]  OR Mail to:  [employer], Coverage Determinations/[place]  [address] [id number],   [place] [address]  OR  Online at:  [email or web address]    Arpita Stewart on 9/14/2020 at 9:10 AM

## 2020-09-16 ENCOUNTER — TELEPHONE (OUTPATIENT)
Dept: FAMILY MEDICINE | Facility: OTHER | Age: 70
End: 2020-09-16

## 2020-09-16 DIAGNOSIS — F41.9 ANXIETY: Primary | ICD-10-CM

## 2020-09-16 RX ORDER — HYDROXYZINE HYDROCHLORIDE 25 MG/1
25 TABLET, FILM COATED ORAL EVERY 4 HOURS PRN
Qty: 120 TABLET | Refills: 11 | Status: SHIPPED | OUTPATIENT
Start: 2020-09-16 | End: 2021-04-21

## 2020-10-13 ENCOUNTER — ALLIED HEALTH/NURSE VISIT (OUTPATIENT)
Dept: FAMILY MEDICINE | Facility: OTHER | Age: 70
End: 2020-10-13
Attending: FAMILY MEDICINE
Payer: MEDICARE

## 2020-10-13 DIAGNOSIS — Z23 NEED FOR PROPHYLACTIC VACCINATION AND INOCULATION AGAINST INFLUENZA: Primary | ICD-10-CM

## 2020-10-13 PROCEDURE — G0008 ADMIN INFLUENZA VIRUS VAC: HCPCS

## 2020-10-13 PROCEDURE — 90662 IIV NO PRSV INCREASED AG IM: CPT

## 2020-11-16 DIAGNOSIS — I10 ESSENTIAL HYPERTENSION, BENIGN: ICD-10-CM

## 2020-11-16 RX ORDER — ATENOLOL 100 MG/1
100 TABLET ORAL DAILY
Qty: 90 TABLET | Refills: 0 | Status: SHIPPED | OUTPATIENT
Start: 2020-11-16 | End: 2021-02-22

## 2020-11-16 RX ORDER — HYDROCHLOROTHIAZIDE 25 MG/1
25 TABLET ORAL DAILY
Qty: 90 TABLET | Refills: 0 | Status: SHIPPED | OUTPATIENT
Start: 2020-11-16 | End: 2021-02-22

## 2020-11-16 RX ORDER — LISINOPRIL 10 MG/1
10 TABLET ORAL DAILY
Qty: 90 TABLET | Refills: 0 | Status: SHIPPED | OUTPATIENT
Start: 2020-11-16 | End: 2021-02-22

## 2020-11-16 NOTE — TELEPHONE ENCOUNTER
[id number] sent Rx request for the following:      HYDROCHLOROTHIAZIDE 25MG TAB  Sig: Take 1 tablet (25 mg) by mouth daily      Last Prescription Date:   12/20/2019  Last Fill Qty/Refills:         90, R-3    Last Office Visit:              12/20/2019   Future Office visit:           none       LISINOPRIL 10MG TABLET  Sig: Take 1 tablet (10 mg) by mouth daily      Last Prescription Date:   12/20/2019  Last Fill Qty/Refills:         90, R-3         ATENOLOL 100MG TABLET  Sig: Take 1 tablet (100 mg) by mouth daily      Last Prescription Date:   12/20/2019  Last Fill Qty/Refills:         90, R-3        Prescription refilled per RN Medication Refill Policy.................... Remigio Mccormick RN ....................  11/16/2020   4:46 PM

## 2021-02-16 DIAGNOSIS — E78.5 DYSLIPIDEMIA: ICD-10-CM

## 2021-02-16 RX ORDER — ATORVASTATIN CALCIUM 40 MG/1
40 TABLET, FILM COATED ORAL DAILY
Qty: 90 TABLET | Refills: 0 | Status: SHIPPED | OUTPATIENT
Start: 2021-02-16 | End: 2021-04-21

## 2021-02-16 NOTE — LETTER
February 16, 2021      Marbin Adams  [address]  [place] [address]        Dear Marbin,         A refill of atorvastatin (LIPITOR) 40 MG tablet has been requested by your pharmacy.  We noticed that it has been greater than 12 months since your last comprehensive visit and labs with Darin Pappas MD.  A limited 90 day supply has been sent to your pharmacy at this time.    Additional refills require a medication management appointment.  Your health is very important to us.  Please call the clinic at [phone] to schedule your appointment.    Thank you,    The Refill Nurse  [facility]

## 2021-02-16 NOTE — TELEPHONE ENCOUNTER
[id number] sent Rx request for the following:   atorvastatin (LIPITOR) 40 MG tablet  Sig:TAKE 1 TABLET (40 MG) BY MOUTH DAILY    Last Prescription Date:   12/20/2019  Last Fill Qty/Refills:         90, R-3    Last Office Visit:              12/20/2019 ([facility])   Future Office visit:           None noted   Statins Protocol Failed - 2/16/2021  1:00 AM        Failed - LDL on file in past 12 months     Recent Labs   Lab Test 12/20/19  1417   LDL 35          Patient overdue for annual review. Letter sent via my[employer]ola. Prescription approved per [facility] Refill Protocol for 90 day richard refill with notation made to requesting pharmacy. Rufina Terrazas RN ....................  2/16/2021   8:45 AM

## 2021-02-20 DIAGNOSIS — I10 ESSENTIAL HYPERTENSION, BENIGN: ICD-10-CM

## 2021-02-20 NOTE — LETTER
February 22, 2021      Marbin Adams  [address] [place] [address]        Dear Marbin,         A refill of lisinopril (ZESTRIL) 10 MG tablet, hydrochlorothiazide (HYDRODIURIL) 25 MG tablet and atenolol (TENORMIN) 100 MG tablet  have been requested by your pharmacy.  We noticed that it has been greater than 12 months since your last comprehensive visit and labs with Darin Pappas MD.  A limited 90 day supply has been sent to your pharmacy at this time.    Additional refills require a medication management appointment.  Your health is very important to us.  Please call the clinic at [phone] to schedule your appointment.    Thank you,    The Refill Nurse  [facility]

## 2021-02-22 RX ORDER — ATENOLOL 100 MG/1
TABLET ORAL
Qty: 90 TABLET | Refills: 0 | Status: SHIPPED | OUTPATIENT
Start: 2021-02-22 | End: 2021-04-21

## 2021-02-22 RX ORDER — LISINOPRIL 10 MG/1
TABLET ORAL
Qty: 90 TABLET | Refills: 0 | Status: SHIPPED | OUTPATIENT
Start: 2021-02-22 | End: 2021-04-21

## 2021-02-22 RX ORDER — HYDROCHLOROTHIAZIDE 25 MG/1
TABLET ORAL
Qty: 90 TABLET | Refills: 0 | Status: SHIPPED | OUTPATIENT
Start: 2021-02-22 | End: 2021-04-21

## 2021-02-22 NOTE — TELEPHONE ENCOUNTER
[id number] sent Rx request for the following:   lisinopril (ZESTRIL) 10 MG tablet  Sig:TAKE 1 TABLET (10 MG) BY MOUTH DAILY *DUE FOR APPT*    Last Prescription Date:   11/16/2020  Last Fill Qty/Refills:         90, R-0    ACE Inhibitors (Including Combos) Protocol Failed - 2/20/2021  1:05 AM       Failed - Normal serum creatinine on file in past 12 months    Recent Labs   Lab Test 12/20/19  1417   CR 0.99       Ok to refill medication if creatinine is low         Failed - Normal serum potassium on file in past 12 months    Recent Labs   Lab Test 12/20/19  1417   POTASSIUM 3.7          hydrochlorothiazide (HYDRODIURIL) 25 MG tablet   Sig: TAKE 1 TABLET (25 MG) BY MOUTH DAILY *DUE FOR APPT*    Last Prescription Date:   11/16/2020  Last Fill Qty/Refills:         90, R-0    Diuretics (Including Combos) Protocol Failed - 2/20/2021  1:05 AM       Failed - Normal serum creatinine on file in past 12 months    Recent Labs   Lab Test 12/20/19  1417   CR 0.99             Failed - Normal serum potassium on file in past 12 months    Recent Labs   Lab Test 12/20/19  1417   POTASSIUM 3.7                   Failed - Normal serum sodium on file in past 12 months    Recent Labs   Lab Test 12/20/19  1417              atenolol (TENORMIN) 100 MG tablet  Sig:TAKE 1 TABLET (100 MG) BY MOUTH DAILY *DUE FOR APPT*    Last Prescription Date:   11/16/2020  Last Fill Qty/Refills:         90, R-0    Beta-Blockers Protocol Passed - 2/20/2021  1:05 AM       Passed - Blood pressure under 140/90 in past 12 months    BP Readings from Last 3 Encounters:   07/29/20 112/62   12/20/19 118/64   09/24/19 124/66                Passed - Patient is age 6 or older       Passed - Recent (12 mo) or future (30 days) visit within the authorizing provider's specialty    Patient has had an office visit with the authorizing provider or a provider within the authorizing providers department within the previous 12 mos or has a future within next 30  "days. See \"Patient Info\" tab in inbasket, or \"Choose Columns\" in Meds & Orders section of the refill encounter.             Passed - Medication is active on med list       Last Office Visit:              12/20/2019 ([facility])   Future Office visit:           None noted    No letter previously sent regarding 3 specific Rx, association with richard refill. Letter sent via [employer] regarding appointment need. Prescription approved per [facility] Refill Protocol for 90 day richard refill with notation made to requesting pharmacy. Rufina Terrazas RN ....................  2/22/2021   9:17 AM        "

## 2021-03-06 ENCOUNTER — HEALTH MAINTENANCE LETTER (OUTPATIENT)
Age: 71
End: 2021-03-06

## 2021-03-18 DIAGNOSIS — R97.20 ELEVATED PSA: Primary | ICD-10-CM

## 2021-04-21 ENCOUNTER — OFFICE VISIT (OUTPATIENT)
Dept: FAMILY MEDICINE | Facility: OTHER | Age: 71
End: 2021-04-21
Attending: FAMILY MEDICINE
Payer: COMMERCIAL

## 2021-04-21 VITALS
HEIGHT: 71 IN | HEART RATE: 65 BPM | DIASTOLIC BLOOD PRESSURE: 64 MMHG | SYSTOLIC BLOOD PRESSURE: 114 MMHG | OXYGEN SATURATION: 96 % | WEIGHT: 222.6 LBS | RESPIRATION RATE: 14 BRPM | BODY MASS INDEX: 31.16 KG/M2 | TEMPERATURE: 96 F

## 2021-04-21 DIAGNOSIS — I10 ESSENTIAL HYPERTENSION, BENIGN: ICD-10-CM

## 2021-04-21 DIAGNOSIS — Z12.5 SCREENING FOR PROSTATE CANCER: ICD-10-CM

## 2021-04-21 DIAGNOSIS — Z00.00 ENCOUNTER FOR MEDICARE ANNUAL WELLNESS EXAM: Primary | ICD-10-CM

## 2021-04-21 DIAGNOSIS — L91.8 SKIN TAG: ICD-10-CM

## 2021-04-21 DIAGNOSIS — E78.5 DYSLIPIDEMIA: ICD-10-CM

## 2021-04-21 LAB
ANION GAP SERPL CALCULATED.3IONS-SCNC: 6 MMOL/L (ref 3–14)
BUN SERPL-MCNC: 18 MG/DL (ref 7–25)
CALCIUM SERPL-MCNC: 10.1 MG/DL (ref 8.6–10.3)
CHLORIDE SERPL-SCNC: 101 MMOL/L (ref 98–107)
CHOLEST SERPL-MCNC: 98 MG/DL
CO2 SERPL-SCNC: 33 MMOL/L (ref 21–31)
CREAT SERPL-MCNC: 0.83 MG/DL (ref 0.7–1.3)
GFR SERPL CREATININE-BSD FRML MDRD: >90 ML/MIN/{1.73_M2}
GLUCOSE SERPL-MCNC: 105 MG/DL (ref 70–105)
HDLC SERPL-MCNC: 30 MG/DL (ref 23–92)
LDLC SERPL CALC-MCNC: 35 MG/DL
NONHDLC SERPL-MCNC: 68 MG/DL
POTASSIUM SERPL-SCNC: 3.8 MMOL/L (ref 3.5–5.1)
PSA SERPL-ACNC: 4.33 NG/ML
SODIUM SERPL-SCNC: 140 MMOL/L (ref 134–144)
TRIGL SERPL-MCNC: 166 MG/DL

## 2021-04-21 PROCEDURE — 36415 COLL VENOUS BLD VENIPUNCTURE: CPT | Mod: ZL | Performed by: FAMILY MEDICINE

## 2021-04-21 PROCEDURE — 80048 BASIC METABOLIC PNL TOTAL CA: CPT | Mod: ZL | Performed by: FAMILY MEDICINE

## 2021-04-21 PROCEDURE — G0439 PPPS, SUBSEQ VISIT: HCPCS | Performed by: FAMILY MEDICINE

## 2021-04-21 PROCEDURE — 80061 LIPID PANEL: CPT | Mod: ZL | Performed by: FAMILY MEDICINE

## 2021-04-21 PROCEDURE — 99213 OFFICE O/P EST LOW 20 MIN: CPT | Mod: 25 | Performed by: FAMILY MEDICINE

## 2021-04-21 PROCEDURE — G0463 HOSPITAL OUTPT CLINIC VISIT: HCPCS | Mod: 25

## 2021-04-21 PROCEDURE — 11200 RMVL SKIN TAGS UP TO&INC 15: CPT | Performed by: FAMILY MEDICINE

## 2021-04-21 PROCEDURE — G0103 PSA SCREENING: HCPCS | Mod: ZL | Performed by: FAMILY MEDICINE

## 2021-04-21 RX ORDER — ATENOLOL 100 MG/1
100 TABLET ORAL DAILY
Qty: 90 TABLET | Refills: 3 | Status: SHIPPED | OUTPATIENT
Start: 2021-04-21 | End: 2022-05-10

## 2021-04-21 RX ORDER — LISINOPRIL 10 MG/1
10 TABLET ORAL DAILY
Qty: 90 TABLET | Refills: 3 | Status: SHIPPED | OUTPATIENT
Start: 2021-04-21 | End: 2022-05-10

## 2021-04-21 RX ORDER — HYDROCHLOROTHIAZIDE 25 MG/1
25 TABLET ORAL DAILY
Qty: 90 TABLET | Refills: 3 | Status: SHIPPED | OUTPATIENT
Start: 2021-04-21 | End: 2022-05-10

## 2021-04-21 RX ORDER — ATORVASTATIN CALCIUM 40 MG/1
40 TABLET, FILM COATED ORAL DAILY
Qty: 90 TABLET | Refills: 3 | Status: SHIPPED | OUTPATIENT
Start: 2021-04-21 | End: 2022-05-10

## 2021-04-21 ASSESSMENT — PATIENT HEALTH QUESTIONNAIRE - PHQ9: 5. POOR APPETITE OR OVEREATING: NOT AT ALL

## 2021-04-21 ASSESSMENT — ANXIETY QUESTIONNAIRES
3. WORRYING TOO MUCH ABOUT DIFFERENT THINGS: NOT AT ALL
GAD7 TOTAL SCORE: 0
2. NOT BEING ABLE TO STOP OR CONTROL WORRYING: NOT AT ALL
IF YOU CHECKED OFF ANY PROBLEMS ON THIS QUESTIONNAIRE, HOW DIFFICULT HAVE THESE PROBLEMS MADE IT FOR YOU TO DO YOUR WORK, TAKE CARE OF THINGS AT HOME, OR GET ALONG WITH OTHER PEOPLE: NOT DIFFICULT AT ALL
1. FEELING NERVOUS, ANXIOUS, OR ON EDGE: NOT AT ALL
5. BEING SO RESTLESS THAT IT IS HARD TO SIT STILL: NOT AT ALL
7. FEELING AFRAID AS IF SOMETHING AWFUL MIGHT HAPPEN: NOT AT ALL
6. BECOMING EASILY ANNOYED OR IRRITABLE: NOT AT ALL

## 2021-04-21 ASSESSMENT — PAIN SCALES - GENERAL: PAINLEVEL: NO PAIN (0)

## 2021-04-21 ASSESSMENT — MIFFLIN-ST. JEOR: SCORE: 1791.84

## 2021-04-21 NOTE — NURSING NOTE
"Coming in for a physical     Is fasting    Chief Complaint   Patient presents with     Physical     and skin tags       Initial /64   Pulse 65   Temp 96  F (35.6  C)   Resp 14   Ht 1.803 m (5' 11\")   Wt 101 kg (222 lb 9.6 oz)   SpO2 96%   BMI 31.05 kg/m   Estimated body mass index is 31.05 kg/m  as calculated from the following:    Height as of this encounter: 1.803 m (5' 11\").    Weight as of this encounter: 101 kg (222 lb 9.6 oz).  Medication Reconciliation: complete    Latasha Juarez LPN     Visual Acuity Screening - Snellen Chart   Visualacuity OD (right eye): 20/ 32  Visual acuity OS (left eye): 20/ 25   Visual acuity OU (both eyes): 20/ 25   Corrective lenses worn: no             "

## 2021-04-21 NOTE — PROGRESS NOTES
"SUBJECTIVE:   Marbin Adams is a 70 year old male who presents for Preventive Visit.      Patient has been advised of split billing requirements and indicates understanding: Yes  Are you in the first 12 months of your Medicare Part B coverage?  No    Physical Health:    In general, how would you rate your overall physical health? excellent    Outside of work, how many days during the week do you exercise? 6-7 days/week    Outside of work, approximately how many minutes a day do you exercise?greater than 60 minutes    If you drink alcohol do you typically have >3 drinks per day or >7 drinks per week? No    Do you usually eat at least 4 servings of fruit and vegetables a day, include whole grains & fiber and avoid regularly eating high fat or \"junk\" foods? Yes    Do you have any problems taking medications regularly?  No    Do you have any side effects from medications? none    Needs assistance for the following daily activities: no assistance needed    Which of the following safety concerns are present in your home?  none identified     Hearing impairment: No    In the past 6 months, have you been bothered by leaking of urine? no    Mental Health:    In general, how would you rate your overall mental or emotional health? excellent  PHQ-2 Score: 0    Do you feel safe in your environment? Yes    Have you ever done Advance Care Planning? (For example, a Health Directive, POLST, or a discussion with a medical provider or your loved ones about your wishes): Yes, advance care planning is on file.    Additional concerns to address?  Med refills. Not checking blood pressure at home.  Has no side effects from the meds at all.      Fall risk:  Fallen 2 or more times in the past year?: No  Any fall with injury in the past year?: No  click delete button to remove this line now  Cognitive Screenin) Repeat 3 items (Leader, Season, Table)    2) Clock draw: NORMAL  3) 3 item recall: Recalls 1 object   Results: NORMAL clock, " 1-2 items recalled: COGNITIVE IMPAIRMENT LESS LIKELY    Mini-CogTM Copyright ROXANN Villafuerte. Licensed by the author for use in [facility]; reprinted with permission (nayeli@[facility]). All rights reserved.              wellness    Reviewed and updated as needed this visit by clinical staff  Tobacco  Allergies  Meds   Med Hx    Soc Hx        Reviewed and updated as needed this visit by Provider                Social History     Tobacco Use     Smoking status: Never Smoker     Smokeless tobacco: Never Used   Substance Use Topics     Alcohol use: Yes     Alcohol/week: 0.8 standard drinks     Comment: Alcoholic Drinks/day: occasionally                           Current providers sharing in care for this patient include:    Patient Care Team:  Darin Pappas MD as PCP - General (Family Practice)  Darin Pappas MD as Assigned PCP  Rupesh Dozier MD as Assigned Surgical Provider    The following health maintenance items are reviewed in Epic and correct as of today:  Health Maintenance   Topic Date Due     HEPATITIS C SCREENING  Never done     AORTIC ANEURYSM SCREENING (SYSTEM ASSIGNED)  Never done     DTAP/TDAP/TD IMMUNIZATION (2 - Td) 09/16/2019     FALL RISK ASSESSMENT  12/20/2020     MEDICARE ANNUAL WELLNESS VISIT  04/21/2022     COLORECTAL CANCER SCREENING  01/15/2023     LIPID  04/21/2026     ADVANCE CARE PLANNING  04/21/2026     PHQ-2  Completed     INFLUENZA VACCINE  Completed     Pneumococcal Vaccine: 65+ Years  Completed     ZOSTER IMMUNIZATION  Completed     COVID-19 Vaccine  Completed     Pneumococcal Vaccine: Pediatrics (0 to 5 Years) and At-Risk Patients (6 to 64 Years)  Aged Out     IPV IMMUNIZATION  Aged Out     MENINGITIS IMMUNIZATION  Aged Out     HEPATITIS B IMMUNIZATION  Aged Out     Lab work is in process  Labs reviewed in EPIC  Current Outpatient Medications   Medication Sig Dispense Refill     atenolol (TENORMIN) 100 MG tablet Take 1 tablet (100 mg) by mouth daily 90 tablet 3     atorvastatin  "(LIPITOR) 40 MG tablet Take 1 tablet (40 mg) by mouth daily 90 tablet 3     DOCOSAHEXAENOIC ACID PO Take 1 capsule by mouth daily       hydrochlorothiazide (HYDRODIURIL) 25 MG tablet Take 1 tablet (25 mg) by mouth daily 90 tablet 3     hydrOXYzine (ATARAX) 25 MG tablet Take 1 tablet (25 mg) by mouth 4 times daily as needed 128 tablet 11     lisinopril (ZESTRIL) 10 MG tablet Take 1 tablet (10 mg) by mouth daily 90 tablet 3     Multiple Vitamins-Minerals (THERA-M) TABS Take 1 tablet by mouth daily       Allergies   Allergen Reactions     Sulfa Drugs Nausea and Vomiting         ROS:  Constitutional, HEENT, cardiovascular, pulmonary, GI, , musculoskeletal, neuro, skin, endocrine and psych systems are negative, except as otherwise noted.    OBJECTIVE:   /64   Pulse 65   Temp 96  F (35.6  C)   Resp 14   Ht 1.803 m (5' 11\")   Wt 101 kg (222 lb 9.6 oz)   SpO2 96%   BMI 31.05 kg/m   Estimated body mass index is 31.05 kg/m  as calculated from the following:    Height as of this encounter: 1.803 m (5' 11\").    Weight as of this encounter: 101 kg (222 lb 9.6 oz).  EXAM:   GENERAL: healthy, alert and no distress  EYES: Eyes grossly normal to inspection, PERRL and conjunctivae and sclerae normal  HENT: ear canals and TM's normal, nose and mouth without ulcers or lesions  NECK: no adenopathy, no asymmetry, masses, or scars and thyroid normal to palpation  RESP: lungs clear to auscultation - no rales, rhonchi or wheezes  CV: regular rate and rhythm, normal S1 S2, no S3 or S4, no murmur, click or rub, no peripheral edema and peripheral pulses strong  ABDOMEN: soft, nontender, no hepatosplenomegaly, no masses and bowel sounds normal  MS: no gross musculoskeletal defects noted, no edema  SKIN: no suspicious lesions or rashes.  Skin tags in both axillae, removed with iris.  NEURO: Normal strength and tone, mentation intact and speech normal  PSYCH: mentation appears normal, affect normal/bright    Diagnostic Test " "Results:  Labs reviewed in Epic  Results for orders placed or performed in visit on 04/21/21   Basic Metabolic Panel     Status: Abnormal   Result Value Ref Range    Sodium 140 134 - 144 mmol/L    Potassium 3.8 3.5 - 5.1 mmol/L    Chloride 101 98 - 107 mmol/L    Carbon Dioxide 33 (H) 21 - 31 mmol/L    Anion Gap 6 3 - 14 mmol/L    Glucose 105 70 - 105 mg/dL    Urea Nitrogen 18 7 - 25 mg/dL    Creatinine 0.83 0.70 - 1.30 mg/dL    GFR Estimate >90 >60 mL/min/[1.73_m2]    GFR Estimate If Black >90 >60 mL/min/[1.73_m2]    Calcium 10.1 8.6 - 10.3 mg/dL   PSA Screen GH     Status: Abnormal   Result Value Ref Range    PSA Screen 4.328 (H) <3.100 ng/mL   Lipid Panel     Status: Abnormal   Result Value Ref Range    Cholesterol 98 <200 mg/dL    Triglycerides 166 (H) <150 mg/dL    HDL Cholesterol 30 23 - 92 mg/dL    LDL Cholesterol Calculated 35 <100 mg/dL    Non HDL Cholesterol 68 <130 mg/dL         ASSESSMENT / PLAN:       ICD-10-CM    1. Encounter for Medicare annual wellness exam  Z00.00    2. Essential hypertension, benign  I10 atenolol (TENORMIN) 100 MG tablet     hydrochlorothiazide (HYDRODIURIL) 25 MG tablet     lisinopril (ZESTRIL) 10 MG tablet     Basic Metabolic Panel     Basic Metabolic Panel   3. Dyslipidemia  E78.5 atorvastatin (LIPITOR) 40 MG tablet     Lipid Panel     Lipid Panel   4. Screening for prostate cancer  Z12.5 PSA Screen GH     PSA Screen GH   5. Skin tag  L91.8 Removal of up to 15 skin tags       Patient has been advised of split billing requirements and indicates understanding: Yes    COUNSELING:  Reviewed preventive health counseling, as reflected in patient instructions       Regular exercise       Healthy diet/nutrition       Colon cancer screening       Prostate cancer screening    Estimated body mass index is 31.05 kg/m  as calculated from the following:    Height as of this encounter: 1.803 m (5' 11\").    Weight as of this encounter: 101 kg (222 lb 9.6 oz).    Weight management plan: Discussed " healthy diet and exercise guidelines    He reports that he has never smoked. He has never used smokeless tobacco.    Appropriate preventive services were discussed with this patient, including applicable screening as appropriate for cardiovascular disease, diabetes, osteopenia/osteoporosis, and glaucoma.  As appropriate for age/gender, discussed screening for colorectal cancer, prostate cancer, breast cancer, and cervical cancer. Checklist reviewing preventive services available has been given to the patient.    Reviewed patients plan of care and provided an AVS. The Basic Care Plan (routine screening as documented in Health Maintenance) for Marbin meets the Care Plan requirement. This Care Plan has been established and reviewed with the Patient.    Counseling Resources:  ATP IV Guidelines  Pooled Cohorts Equation Calculator  Breast Cancer Risk Calculator  BRCA-Related Cancer Risk Assessment: FHS-7 Tool  FRAX Risk Assessment  ICSI Preventive Guidelines  Dietary Guidelines for Americans, 2010  USDA's MyPlate  ASA Prophylaxis  Lung CA Screening    Darin Pappas MD  [facility] \[facility]\""

## 2021-04-21 NOTE — PATIENT INSTRUCTIONS
Patient Education   Personalized Prevention Plan  You are due for the preventive services outlined below.  Your care team is available to assist you in scheduling these services.  If you have already completed any of these items, please share that information with your care team to update in your medical record.  Health Maintenance Due   Topic Date Due     Hepatitis C Screening  Never done     AORTIC ANEURYSM SCREENING (SYSTEM ASSIGNED)  Never done     Diptheria Tetanus Pertussis (DTAP/TDAP/TD) Vaccine (2 - Td) 09/16/2019     FALL RISK ASSESSMENT  12/20/2020

## 2021-04-22 ASSESSMENT — ANXIETY QUESTIONNAIRES: GAD7 TOTAL SCORE: 0

## 2021-06-18 ENCOUNTER — OFFICE VISIT (OUTPATIENT)
Dept: FAMILY MEDICINE | Facility: OTHER | Age: 71
End: 2021-06-18
Attending: FAMILY MEDICINE
Payer: MEDICARE

## 2021-06-18 ENCOUNTER — HOSPITAL ENCOUNTER (OUTPATIENT)
Dept: GENERAL RADIOLOGY | Facility: OTHER | Age: 71
End: 2021-06-18
Attending: FAMILY MEDICINE
Payer: MEDICARE

## 2021-06-18 VITALS
HEART RATE: 72 BPM | DIASTOLIC BLOOD PRESSURE: 70 MMHG | SYSTOLIC BLOOD PRESSURE: 126 MMHG | BODY MASS INDEX: 31.19 KG/M2 | TEMPERATURE: 97.8 F | WEIGHT: 222.8 LBS | RESPIRATION RATE: 18 BRPM | OXYGEN SATURATION: 98 % | HEIGHT: 71 IN

## 2021-06-18 DIAGNOSIS — M25.562 ACUTE PAIN OF LEFT KNEE: Primary | ICD-10-CM

## 2021-06-18 DIAGNOSIS — M25.562 ACUTE PAIN OF LEFT KNEE: ICD-10-CM

## 2021-06-18 PROCEDURE — 73560 X-RAY EXAM OF KNEE 1 OR 2: CPT | Mod: RT

## 2021-06-18 PROCEDURE — 73564 X-RAY EXAM KNEE 4 OR MORE: CPT | Mod: LT

## 2021-06-18 PROCEDURE — 99213 OFFICE O/P EST LOW 20 MIN: CPT | Performed by: FAMILY MEDICINE

## 2021-06-18 PROCEDURE — G0463 HOSPITAL OUTPT CLINIC VISIT: HCPCS

## 2021-06-18 RX ORDER — NAPROXEN 500 MG/1
500 TABLET ORAL 2 TIMES DAILY WITH MEALS
Qty: 60 TABLET | Refills: 3 | Status: SHIPPED | OUTPATIENT
Start: 2021-06-18 | End: 2022-06-17

## 2021-06-18 ASSESSMENT — MIFFLIN-ST. JEOR: SCORE: 1787.74

## 2021-06-18 ASSESSMENT — PAIN SCALES - GENERAL: PAINLEVEL: EXTREME PAIN (8)

## 2021-06-18 NOTE — NURSING NOTE
Patient presents today for left knee pain x2 day.    Medication Reconciliation Complete    Tayler Bae LPN  6/18/2021 3:43 PM

## 2021-06-18 NOTE — PROGRESS NOTES
"SUBJECTIVE:  Marbin Adams is a 71 year old male here for left knee pain.  He reports that his pain started approximate 2 days ago.  He is unaware of any activity or injury that brought this on.  No previous injury to his left knee.  He describes tightness and swelling in his knee especially posteriorly.  He has not anything regularly for this at home.  He reports that he was walking quite a bit in [place] but otherwise no change in his activity which generally consists of 10-15,000 steps a day.    ROS:    As above otherwise ROS is unremarkable.    OBJECTIVE:  /70   Pulse 72   Temp 97.8  F (36.6  C)   Resp 18   Ht 1.803 m (5' 11\")   Wt 101.1 kg (222 lb 12.8 oz)   SpO2 98%   BMI 31.07 kg/m      EXAM:  General Appearance: Pleasant, alert, appropriate appearance for age. No acute distress  Musculoskeletal: Left knee shows normal range of motion with mild crepitus.  He has swelling of his popliteal fossa.  No joint space tenderness.  Negative Sadaf's.  Normal varus, valgus and anterior drawer and Lachman's.  Skin: No Erythema or warmth.  Neurologic Exam: No focal motor or sensory deficits.    ASSESSEMENT AND PLAN:    1. Acute pain of left knee      X-rays were obtained, personally reviewed and shows no significant bony abnormality very minimal arthritis.  Suspect that he has increased fluid in his knee and a popliteal cyst.  At this time will recommend ice and naproxen twice daily with meals for 10 days.  Discussed potential side effects.  Activity as tolerated.  If he is having no significant improvement in the next couple of weeks we could consider MRI to evaluate for underlying internal pathology.    Lake Magdaleno MD  Family Medicine  "

## 2021-06-28 ENCOUNTER — OFFICE VISIT (OUTPATIENT)
Dept: FAMILY MEDICINE | Facility: OTHER | Age: 71
End: 2021-06-28
Attending: FAMILY MEDICINE
Payer: COMMERCIAL

## 2021-06-28 VITALS
DIASTOLIC BLOOD PRESSURE: 76 MMHG | BODY MASS INDEX: 31.08 KG/M2 | SYSTOLIC BLOOD PRESSURE: 128 MMHG | RESPIRATION RATE: 20 BRPM | HEART RATE: 72 BPM | OXYGEN SATURATION: 98 % | TEMPERATURE: 98 F | HEIGHT: 71 IN | WEIGHT: 222 LBS

## 2021-06-28 DIAGNOSIS — M25.562 ACUTE PAIN OF LEFT KNEE: Primary | ICD-10-CM

## 2021-06-28 PROCEDURE — G0463 HOSPITAL OUTPT CLINIC VISIT: HCPCS | Performed by: FAMILY MEDICINE

## 2021-06-28 PROCEDURE — 99213 OFFICE O/P EST LOW 20 MIN: CPT | Performed by: FAMILY MEDICINE

## 2021-06-28 ASSESSMENT — PAIN SCALES - GENERAL: PAINLEVEL: SEVERE PAIN (6)

## 2021-06-28 ASSESSMENT — MIFFLIN-ST. JEOR: SCORE: 1784.12

## 2021-06-28 NOTE — PROGRESS NOTES
"SUBJECTIVE:  Marbin Adams is a 71 year old male here for left knee pain follow-up.  He was seen earlier this month for left knee pain.  He was having some improvement however he then had increasing pain and had difficulty placing any weight on his left leg.  This was accompanied by loud popping sensation.  Over the last couple of days his pain has improved and range of motion has returned back to normal.    ROS:    As above otherwise ROS is unremarkable.    OBJECTIVE:  /76   Pulse 72   Temp 98  F (36.7  C)   Resp 20   Ht 1.803 m (5' 11\")   Wt 100.7 kg (222 lb)   SpO2 98%   BMI 30.96 kg/m      EXAM:  General Appearance: Pleasant, alert, appropriate appearance for age. No acute distress  Musculoskeletal: Left knee shows normal range of motion from 0 to 90 degrees without any crepitus.  No joint space tenderness.  He has mild swelling posteriorly.  Normal varus, valgus and anterior drawer Lachman's.  Negative Sadaf's.  Skin: No erythema or warmth.    ASSESSEMENT AND PLAN:    1. Acute pain of left knee      Reassurance was given since he is improving.  His history and symptoms are consistent with a meniscal injury.  Since he is improving we will hold off on any imaging at this time.  If he has pain and symptoms worsen in the next few weeks we could always consider an MRI of his knee.  The meantime he will continue on anti-inflammatories and ice and activity as tolerated.    Lake Magdaleno MD  Family Medicine  "

## 2021-06-28 NOTE — NURSING NOTE
Patient presents today for follow up on left knee.     Medication Reconciliation Complete    Tayler Bae LPN  6/28/2021 12:43 PM

## 2021-07-14 ENCOUNTER — OFFICE VISIT (OUTPATIENT)
Dept: UROLOGY | Facility: OTHER | Age: 71
End: 2021-07-14
Attending: UROLOGY
Payer: MEDICARE

## 2021-07-14 ENCOUNTER — LAB (OUTPATIENT)
Dept: LAB | Facility: OTHER | Age: 71
End: 2021-07-14
Attending: UROLOGY
Payer: MEDICARE

## 2021-07-14 VITALS
HEART RATE: 67 BPM | DIASTOLIC BLOOD PRESSURE: 70 MMHG | RESPIRATION RATE: 16 BRPM | BODY MASS INDEX: 30.96 KG/M2 | OXYGEN SATURATION: 98 % | SYSTOLIC BLOOD PRESSURE: 126 MMHG | WEIGHT: 222 LBS

## 2021-07-14 DIAGNOSIS — R97.20 ELEVATED PSA: Primary | ICD-10-CM

## 2021-07-14 DIAGNOSIS — R97.20 ELEVATED PSA: ICD-10-CM

## 2021-07-14 LAB — PSA SERPL-MCNC: 4.93 UG/L (ref 0–4)

## 2021-07-14 PROCEDURE — 99213 OFFICE O/P EST LOW 20 MIN: CPT | Performed by: UROLOGY

## 2021-07-14 PROCEDURE — 36415 COLL VENOUS BLD VENIPUNCTURE: CPT | Mod: ZL

## 2021-07-14 PROCEDURE — 84153 ASSAY OF PSA TOTAL: CPT | Mod: ZL

## 2021-07-14 PROCEDURE — G0463 HOSPITAL OUTPT CLINIC VISIT: HCPCS

## 2021-07-14 ASSESSMENT — PAIN SCALES - GENERAL: PAINLEVEL: NO PAIN (0)

## 2021-07-14 NOTE — PROGRESS NOTES
Type of Visit  Established    Chief Complaint  Elevated PSA    HPI  Mr. Adams is a 71 y.o. male with +FH of prostate cancer and history of negative prostate biopsy following up with elevated PSA.    He does have a family history of prostate cancer.  His brother was diagnosed 12 years ago in his 60s and underwent prostatectomy.  Today he continues to deny unexplained weight loss, significant changes in urinary function, pelvic and bone pain.  He also denies any changes in health in the last year.  He underwent a PSA recently and follows up to review the results.    He denies new urinary symptoms, he is satisfied with his voiding pattern.  He does have a few questions regarding diminished strength of stream in the morning which resolves by afternoon.  He does not wake to void.      Review of Systems  I reviewed the ROS the patient today.    Nursing Notes:   Dominique Bautista LPN  7/14/2021 10:46 AM  Signed  Chief Complaint   Patient presents with     Follow Up     1 year follow up elevated PSA   Patient presents to the clinic today for a 1 year follow up for elevated PSA    Review of Systems:    Weight loss:    No     Recent fever/chills:  No   Night sweats:   No  Current skin rash:  No   Recent hair loss:  No  Heat intolerance:  No   Cold intolerance:  No  Chest pain:   No   Palpitations:   No  Shortness of breath:  No   Wheezing:   No  Constipation:    No   Diarrhea:   No   Nausea:   No   Vomiting:   No   Kidney/side pain:  No   Back pain:   No  Frequent headaches:  No   Dizziness:     No  Leg swelling:   No   Calf pain:    No          Medication Reconciliation: completed   Dominique Bautista LPN  7/14/2021 10:45 AM     Physical Exam  /70 (BP Location: Right arm, Patient Position: Sitting, Cuff Size: Adult Large)   Pulse 67   Resp 16   Wt 100.7 kg (222 lb)   SpO2 98%   BMI 30.96 kg/m    Constitutional: NAD, WDWN.  Cardiovascular: Regular rate.  Pulmonary/Chest: Respirations are even and non-labored  bilaterally.  Abdominal: Soft. No distension, tenderness, masses or guarding. No CVA tenderness.  Extremities: CAROL x 4, Warm. No clubbing.  No cyanosis.    Skin: Pink, warm and dry.  No rashes noted.  Prostate:   Normal rectal tone, 40-50 grams.      Symmetric, non-tender, anodular and no induration - stable    Labs  Results for ANNITA ADAMS (MRN [id number]) as of 7/14/2021 10:58   12/20/2019 14:17 7/29/2020 09:04 4/21/2021 09:18 7/14/2021 08:37   PSA 5.082 (H) 4.174 (H) 4.328 (H) 4.93 (H)     Results for ANNITA ADAMS (MRN [id number]) as of 7/29/2020 11:18   7/25/2019 08:07   PSA 5.462 (H)     Results for ANNITA ADAMS (MRN [id number]) as of 7/25/2019 10:48   1/16/2018 08:50 7/23/2018 07:08   PSA 5.221 (H) 2.817     Results for ANNITA ADAMS (MRN [id number]) as of 1/18/2018 09:55   11/20/2014 11:26 5/22/2015 07:32 6/13/2016 09:27 6/29/2017 07:50   PSA 1.580 2.212 1.769 4.430 (H)     Pathology  7/3/2017  Negative 12 cores out of 12 cores  Calculated prostate volume based on TRUS: 55 grams    Assessment  Mr. Adams is a 71 y.o. male with +FH of prostate cancer and history of negative prostate biopsy following up with elevated but stable PSA.    I explained that with patients who do not wake to void significantly diminished strength of stream in the morning is common due to a relatively over filled bladder and subsequent diminished detrusor strength.    Plan  Follow up in 1 year with PSA.

## 2021-07-14 NOTE — NURSING NOTE
Chief Complaint   Patient presents with     Follow Up     1 year follow up elevated PSA   Patient presents to the clinic today for a 1 year follow up for elevated PSA    Review of Systems:    Weight loss:    No     Recent fever/chills:  No   Night sweats:   No  Current skin rash:  No   Recent hair loss:  No  Heat intolerance:  No   Cold intolerance:  No  Chest pain:   No   Palpitations:   No  Shortness of breath:  No   Wheezing:   No  Constipation:    No   Diarrhea:   No   Nausea:   No   Vomiting:   No   Kidney/side pain:  No   Back pain:   No  Frequent headaches:  No   Dizziness:     No  Leg swelling:   No   Calf pain:    No          Medication Reconciliation: completed   Dominique Bautista LPN  7/14/2021 10:45 AM

## 2021-10-09 ENCOUNTER — HEALTH MAINTENANCE LETTER (OUTPATIENT)
Age: 71
End: 2021-10-09

## 2022-05-09 DIAGNOSIS — E78.5 DYSLIPIDEMIA: ICD-10-CM

## 2022-05-09 DIAGNOSIS — I10 ESSENTIAL HYPERTENSION, BENIGN: ICD-10-CM

## 2022-05-10 RX ORDER — ATENOLOL 100 MG/1
100 TABLET ORAL DAILY
Qty: 90 TABLET | Refills: 4 | Status: SHIPPED | OUTPATIENT
Start: 2022-05-10 | End: 2023-05-04

## 2022-05-10 RX ORDER — HYDROCHLOROTHIAZIDE 25 MG/1
25 TABLET ORAL DAILY
Qty: 90 TABLET | Refills: 4 | Status: SHIPPED | OUTPATIENT
Start: 2022-05-10 | End: 2023-05-04

## 2022-05-10 RX ORDER — ATORVASTATIN CALCIUM 40 MG/1
40 TABLET, FILM COATED ORAL DAILY
Qty: 90 TABLET | Refills: 4 | Status: SHIPPED | OUTPATIENT
Start: 2022-05-10 | End: 2023-05-04

## 2022-05-10 RX ORDER — LISINOPRIL 10 MG/1
10 TABLET ORAL DAILY
Qty: 90 TABLET | Refills: 4 | Status: SHIPPED | OUTPATIENT
Start: 2022-05-10 | End: 2023-05-04

## 2022-05-10 NOTE — TELEPHONE ENCOUNTER
[id number] ([facility] [employer] [facility]) of [place] sent Rx request for the following:     From pharmacy: Patient enrolled in our Rx Med Sync service to improve adherence. We are requesting a refill authorization in advance to ensure an active prescription is on file.     Requested Prescriptions   Pending Prescriptions Disp Refills     atorvastatin (LIPITOR) 40 MG tablet [Pharmacy Med Name: ATORVASTATIN 40MG TABLET] 90 tablet 3     Sig: TAKE 1 TABLET (40 MG) BY MOUTH DAILY   Last Prescription Date:   4/21/21  Last Fill Qty/Refills:         90, R-3     Statins Protocol Failed - 5/10/2022  1:09 PM        Failed - LDL on file in past 12 months     Recent Labs   Lab Test 04/21/21  0918   LDL 35           atenolol (TENORMIN) 100 MG tablet [Pharmacy Med Name: ATENOLOL 100MG TABLET] 90 tablet 3     Sig: TAKE 1 TABLET (100 MG) BY MOUTH DAILY   Last Prescription Date:   4/21/21  Last Fill Qty/Refills:         90, R-3        lisinopril (ZESTRIL) 10 MG tablet [Pharmacy Med Name: LISINOPRIL 10MG TABLET] 90 tablet 3     Sig: TAKE 1 TABLET (10 MG) BY MOUTH DAILY   Last Prescription Date:   4/21/21  Last Fill Qty/Refills:         90, R-3     ACE Inhibitors (Including Combos) Protocol Failed - 5/10/2022  1:10 PM        Failed - Normal serum creatinine on file in past 12 months     Recent Labs   Lab Test 04/21/21  0918   CR 0.83           Failed - Normal serum potassium on file in past 12 months     Recent Labs   Lab Test 04/21/21  0918   POTASSIUM 3.8           hydrochlorothiazide (HYDRODIURIL) 25 MG tablet [Pharmacy Med Name: HYDROCHLOROTHIAZIDE 25MG TAB] 90 tablet 3     Sig: TAKE 1 TABLET (25 MG) BY MOUTH DAILY   Last Prescription Date:   4/21/21  Last Fill Qty/Refills:         90, R-3     Diuretics (Including Combos) Protocol Failed - 5/10/2022  1:10 PM        Failed - Normal serum creatinine on file in past 12 months     Recent Labs   Lab Test 04/21/21  0918   CR 0.83           Failed - Normal serum potassium on file in  past 12 months     Recent Labs   Lab Test 04/21/21  0918   POTASSIUM 3.8           Failed - Normal serum sodium on file in past 12 months     Recent Labs   Lab Test 04/21/21  0918           Last Office Visit:              6/28/21   Future Office visit:           None    Delmy Mckenzie RN .............. 5/10/2022  1:12 PM

## 2022-05-17 DIAGNOSIS — R97.20 ELEVATED PSA: Primary | ICD-10-CM

## 2022-05-17 NOTE — PROGRESS NOTES
"Per last office visit  7/14/21 with Rupesh Dozier MD \"Plan  Follow up in 1 year with PSA.\"        Orders Placed    "

## 2022-05-21 ENCOUNTER — HEALTH MAINTENANCE LETTER (OUTPATIENT)
Age: 72
End: 2022-05-21

## 2022-06-17 ENCOUNTER — OFFICE VISIT (OUTPATIENT)
Dept: FAMILY MEDICINE | Facility: OTHER | Age: 72
End: 2022-06-17
Attending: FAMILY MEDICINE
Payer: COMMERCIAL

## 2022-06-17 VITALS
TEMPERATURE: 97.7 F | WEIGHT: 220 LBS | SYSTOLIC BLOOD PRESSURE: 124 MMHG | DIASTOLIC BLOOD PRESSURE: 80 MMHG | HEART RATE: 66 BPM | OXYGEN SATURATION: 97 % | BODY MASS INDEX: 30.68 KG/M2 | RESPIRATION RATE: 16 BRPM

## 2022-06-17 DIAGNOSIS — R97.20 ELEVATED PSA: ICD-10-CM

## 2022-06-17 DIAGNOSIS — M25.562 ACUTE PAIN OF LEFT KNEE: ICD-10-CM

## 2022-06-17 DIAGNOSIS — I10 ESSENTIAL HYPERTENSION, BENIGN: ICD-10-CM

## 2022-06-17 DIAGNOSIS — E78.5 DYSLIPIDEMIA: ICD-10-CM

## 2022-06-17 DIAGNOSIS — Z00.00 ENCOUNTER FOR MEDICARE ANNUAL WELLNESS EXAM: Primary | ICD-10-CM

## 2022-06-17 DIAGNOSIS — Z12.5 SCREENING FOR PROSTATE CANCER: ICD-10-CM

## 2022-06-17 LAB
ANION GAP SERPL CALCULATED.3IONS-SCNC: 8 MMOL/L (ref 3–14)
BUN SERPL-MCNC: 22 MG/DL (ref 7–25)
CALCIUM SERPL-MCNC: 10 MG/DL (ref 8.6–10.3)
CHLORIDE BLD-SCNC: 102 MMOL/L (ref 98–107)
CHOLEST SERPL-MCNC: 106 MG/DL
CO2 SERPL-SCNC: 31 MMOL/L (ref 21–31)
CREAT SERPL-MCNC: 0.91 MG/DL (ref 0.7–1.3)
FASTING STATUS PATIENT QL REPORTED: ABNORMAL
GFR SERPL CREATININE-BSD FRML MDRD: 90 ML/MIN/1.73M2
GLUCOSE BLD-MCNC: 104 MG/DL (ref 70–105)
HDLC SERPL-MCNC: 30 MG/DL (ref 23–92)
LDLC SERPL CALC-MCNC: 39 MG/DL
NONHDLC SERPL-MCNC: 76 MG/DL
POTASSIUM BLD-SCNC: 3.6 MMOL/L (ref 3.5–5.1)
PSA SERPL-MCNC: 4.4 UG/L (ref 0–4)
SODIUM SERPL-SCNC: 141 MMOL/L (ref 134–144)
TRIGL SERPL-MCNC: 186 MG/DL

## 2022-06-17 PROCEDURE — 80048 BASIC METABOLIC PNL TOTAL CA: CPT | Mod: ZL | Performed by: FAMILY MEDICINE

## 2022-06-17 PROCEDURE — 99213 OFFICE O/P EST LOW 20 MIN: CPT | Mod: 25 | Performed by: FAMILY MEDICINE

## 2022-06-17 PROCEDURE — 36415 COLL VENOUS BLD VENIPUNCTURE: CPT | Mod: ZL | Performed by: FAMILY MEDICINE

## 2022-06-17 PROCEDURE — G0463 HOSPITAL OUTPT CLINIC VISIT: HCPCS

## 2022-06-17 PROCEDURE — G0439 PPPS, SUBSEQ VISIT: HCPCS | Performed by: FAMILY MEDICINE

## 2022-06-17 PROCEDURE — 80061 LIPID PANEL: CPT | Mod: ZL | Performed by: FAMILY MEDICINE

## 2022-06-17 PROCEDURE — G0103 PSA SCREENING: HCPCS | Mod: ZL | Performed by: FAMILY MEDICINE

## 2022-06-17 RX ORDER — NAPROXEN 500 MG/1
500 TABLET ORAL 2 TIMES DAILY WITH MEALS
Qty: 60 TABLET | Refills: 3 | Status: SHIPPED | OUTPATIENT
Start: 2022-06-17 | End: 2023-07-19

## 2022-06-17 ASSESSMENT — ENCOUNTER SYMPTOMS
EYE PAIN: 0
NAUSEA: 0
DIARRHEA: 0
HEMATURIA: 0
HEMATOCHEZIA: 0
PARESTHESIAS: 0
PALPITATIONS: 0
CHILLS: 0
CONSTIPATION: 0
COUGH: 0
WEAKNESS: 0
HEARTBURN: 0
NERVOUS/ANXIOUS: 0
DIZZINESS: 0
ARTHRALGIAS: 0
SORE THROAT: 0
ABDOMINAL PAIN: 0
FREQUENCY: 0
FEVER: 0
DYSURIA: 0
MYALGIAS: 0
HEADACHES: 0
SHORTNESS OF BREATH: 0
JOINT SWELLING: 0

## 2022-06-17 ASSESSMENT — ANXIETY QUESTIONNAIRES
GAD7 TOTAL SCORE: 0
7. FEELING AFRAID AS IF SOMETHING AWFUL MIGHT HAPPEN: NOT AT ALL
7. FEELING AFRAID AS IF SOMETHING AWFUL MIGHT HAPPEN: NOT AT ALL
6. BECOMING EASILY ANNOYED OR IRRITABLE: NOT AT ALL
3. WORRYING TOO MUCH ABOUT DIFFERENT THINGS: NOT AT ALL
5. BEING SO RESTLESS THAT IT IS HARD TO SIT STILL: NOT AT ALL
GAD7 TOTAL SCORE: 0
2. NOT BEING ABLE TO STOP OR CONTROL WORRYING: NOT AT ALL
GAD7 TOTAL SCORE: 0
4. TROUBLE RELAXING: NOT AT ALL
8. IF YOU CHECKED OFF ANY PROBLEMS, HOW DIFFICULT HAVE THESE MADE IT FOR YOU TO DO YOUR WORK, TAKE CARE OF THINGS AT HOME, OR GET ALONG WITH OTHER PEOPLE?: NOT DIFFICULT AT ALL
1. FEELING NERVOUS, ANXIOUS, OR ON EDGE: NOT AT ALL

## 2022-06-17 ASSESSMENT — ACTIVITIES OF DAILY LIVING (ADL): CURRENT_FUNCTION: NO ASSISTANCE NEEDED

## 2022-06-17 ASSESSMENT — PAIN SCALES - GENERAL: PAINLEVEL: NO PAIN (0)

## 2022-06-17 NOTE — PATIENT INSTRUCTIONS
Patient Education   Personalized Prevention Plan  You are due for the preventive services outlined below.  Your care team is available to assist you in scheduling these services.  If you have already completed any of these items, please share that information with your care team to update in your medical record.  Health Maintenance Due   Topic Date Due     Hepatitis C Screening  Never done     AORTIC ANEURYSM SCREENING (SYSTEM ASSIGNED)  Never done     Diptheria Tetanus Pertussis (DTAP/TDAP/TD) Vaccine (2 - Td or Tdap) 09/16/2019       Understanding USDA MyPlate  The USDA has guidelines to help you make healthy food choices. These are called MyPlate. MyPlate shows the food groups that make up healthy meals using the image of a place setting. Before you eat, think about the healthiest choices for what to put on your plate or in your cup or bowl. To learn more about building a healthy plate, visit www.choosemyplate.gov.    The food groups    Fruits. Any fruit or 100% fruit juice counts as part of the Fruit Group. Fruits may be fresh, canned, frozen, or dried, and may be whole, cut-up, or pureed. Make 1/2 of your plate fruits and vegetables.    Vegetables. Any vegetable or 100% vegetable juice counts as a member of the Vegetable Group. Vegetables may be fresh, frozen, canned, or dried. They can be served raw or cooked and may be whole, cut-up, or mashed. Make 1/2 of your plate fruits and vegetables.    Grains. All foods made from grains are part of the Grains Group. These include wheat, rice, oats, cornmeal, and barley. Grains are often used to make foods such as bread, pasta, oatmeal, cereal, tortillas, and grits. Grains should be no more than 1/4 of your plate. At least half of your grains should be whole grains.    Protein. This group includes meat, poultry, seafood, beans and peas, eggs, processed soy products (such as tofu), nuts (including nut butters), and seeds. Make protein choices no more than 1/4 of your  plate. Meat and poultry choices should be lean or low fat.    Dairy. The Dairy Group includes all fluid milk products and foods made from milk that contain calcium, such as yogurt and cheese. (Foods that have little calcium, such as cream, butter, and cream cheese, are not part of this group.) Most dairy choices should be low-fat or fat-free.    Oils. Oils aren't a food group, but they do contain essential nutrients. However it's important to watch your intake of oils. These are fats that are liquid at room temperature. They include canola, corn, olive, soybean, vegetable, and sunflower oil. Foods that are mainly oil include mayonnaise, certain salad dressings, and soft margarines. You likely already get your daily oil allowance from the foods you eat.  Things to limit  Eating healthy also means limiting these things in your diet:       Salt (sodium). Many processed foods have a lot of sodium. To keep sodium intake down, eat fresh vegetables, meats, poultry, and seafood when possible. Purchase low-sodium, reduced-sodium, or no-salt-added food products at the store. And don't add salt to your meals at home. Instead, season them with herbs and spices such as dill, oregano, cumin, and paprika. Or try adding flavor with lemon or lime zest and juice.    Saturated fat. Saturated fats are most often found in animal products such as beef, pork, and chicken. They are often solid at room temperature, such as butter. To reduce your saturated fat intake, choose leaner cuts of meat and poultry. And try healthier cooking methods such as grilling, broiling, roasting, or baking. For a simple lower-fat swap, use plain nonfat yogurt instead of mayonnaise when making potato salad or macaroni salad.    Added sugars. These are sugars added to foods. They are in foods such as ice cream, candy, soda, fruit drinks, sports drinks, energy drinks, cookies, pastries, jams, and syrups. Cut down on added sugars by sharing sweet treats with a  family member or friend. You can also choose fruit for dessert, and drink water or other unsweetened beverages.     StayWell last reviewed this educational content on 6/1/2020 2000-2021 [employer]. All rights reserved. This information is not intended as a substitute for professional medical care. Always follow your healthcare professional's instructions.          Signs of Hearing Loss      Hearing much better with one ear can be a sign of hearing loss.   Hearing loss is a problem shared by many people. In fact, it is one of the most common health problems, particularly as people age. Most people age 65 and older have some hearing loss. By age 80, almost everyone does. Hearing loss often occurs slowly over the years. So you may not realize your hearing has gotten worse.  Have your hearing checked  Call your healthcare provider if you:    Have to strain to hear normal conversation    Have to watch other people s faces very carefully to follow what they re saying    Need to ask people to repeat what they ve said    Often misunderstand what people are saying    Turn the volume of the television or radio up so high that others complain    Feel that people are mumbling when they re talking to you    Find that the effort to hear leaves you feeling tired and irritated    Notice, when using the phone, that you hear better with one ear than the other  StayWell last reviewed this educational content on 1/1/2020 2000-2021 [employer]. All rights reserved. This information is not intended as a substitute for professional medical care. Always follow your healthcare professional's instructions.

## 2022-06-17 NOTE — NURSING NOTE
"Patient presents to the clinic today for a medicare wellness exam.  Yaneth Jurado LPN 6/17/2022   10:31 AM    Chief Complaint   Patient presents with     Medicare Visit     Medicare Wellness       Initial /80 (BP Location: Right arm, Patient Position: Sitting, Cuff Size: Adult Regular)   Pulse 66   Temp 97.7  F (36.5  C) (Tympanic)   Resp 16   Wt 99.8 kg (220 lb)   SpO2 97%   BMI 30.68 kg/m   Estimated body mass index is 30.68 kg/m  as calculated from the following:    Height as of 6/28/21: 1.803 m (5' 11\").    Weight as of this encounter: 99.8 kg (220 lb).  Medication Reconciliation: complete  Yaneth Jurado LPN    "

## 2022-06-17 NOTE — PROGRESS NOTES
"SUBJECTIVE:   Marbin Adams is a 72 year old male who presents for Preventive Visit.      Patient has been advised of split billing requirements and indicates understanding: Yes  Are you in the first 12 months of your Medicare coverage?  No    Healthy Habits:     In general, how would you rate your overall health?  Excellent    Frequency of exercise:  4-5 days/week    Duration of exercise:  30-45 minutes    Do you usually eat at least 4 servings of fruit and vegetables a day, include whole grains    & fiber and avoid regularly eating high fat or \"junk\" foods?  No    Taking medications regularly:  Yes    Medication side effects:  None    Ability to successfully perform activities of daily living:  No assistance needed    Home Safety:  No safety concerns identified    Hearing Impairment:  Difficulty following a conversation in a noisy restaurant or crowded room    In the past 6 months, have you been bothered by leaking of urine?  No    In general, how would you rate your overall mental or emotional health?  Excellent      PHQ-2 Total Score: 0    Additional concerns today:  No    Do you feel safe in your environment? Yes    Have you ever done Advance Care Planning? (For example, a Health Directive, POLST, or a discussion with a medical provider or your loved ones about your wishes): No, advance care planning information given to patient to review.  Patient declined advance care planning discussion at this time.    Fall risk  Fallen 2 or more times in the past year?: No  Any fall with injury in the past year?: No  click delete button to remove this line now  Cognitive Screening   1) Repeat 3 items (Leader, Season, Table)    2) Clock draw: NORMAL  3) 3 item recall: Recalls 3 objects  Results: 3 items recalled: COGNITIVE IMPAIRMENT LESS LIKELY    Mini-CogTM Copyright ROXANN Villafuerte. Licensed by the author for use in [place] [employer]; reprinted with permission (nayeli@.[facility]). All rights reserved.      Do you have sleep " apnea, excessive snoring or daytime drowsiness?: no    Reviewed and updated as needed this visit by clinical staff   Tobacco  Allergies  Meds   Med Hx  Surg Hx  Fam Hx  Soc Hx          Reviewed and updated as needed this visit by Provider                   Social History     Tobacco Use     Smoking status: Never Smoker     Smokeless tobacco: Never Used   Substance Use Topics     Alcohol use: Yes     Alcohol/week: 0.8 standard drinks     Comment: Alcoholic Drinks/day: very occasionally     If you drink alcohol do you typically have >3 drinks per day or >7 drinks per week? No    Alcohol Use 6/17/2022   Prescreen: >3 drinks/day or >7 drinks/week? No   No flowsheet data found.        Left knee pain now, after working in his deck.  Wants a refill on naproxen.  Had his other meds refilled over the phone in May.    Current providers sharing in care for this patient include:   Patient Care Team:  Darin Pappas MD as PCP - General (Family Practice)  Rupesh Dozier MD as Assigned Surgical Provider  Darin Pappas MD as Assigned PCP    The following health maintenance items are reviewed in Epic and correct as of today:  Health Maintenance Due   Topic Date Due     HEPATITIS C SCREENING  Never done     AORTIC ANEURYSM SCREENING (SYSTEM ASSIGNED)  Never done     DTAP/TDAP/TD IMMUNIZATION (2 - Td or Tdap) 09/16/2019     MEDICARE ANNUAL WELLNESS VISIT  04/21/2022     Labs reviewed in [facility]  Current Outpatient Medications   Medication Sig Dispense Refill     atenolol (TENORMIN) 100 MG tablet TAKE 1 TABLET (100 MG) BY MOUTH DAILY 90 tablet 4     atorvastatin (LIPITOR) 40 MG tablet TAKE 1 TABLET (40 MG) BY MOUTH DAILY 90 tablet 4     hydrochlorothiazide (HYDRODIURIL) 25 MG tablet TAKE 1 TABLET (25 MG) BY MOUTH DAILY 90 tablet 4     lisinopril (ZESTRIL) 10 MG tablet TAKE 1 TABLET (10 MG) BY MOUTH DAILY 90 tablet 4     Multiple Vitamins-Minerals (THERA-M) TABS Take 1 tablet by mouth daily       naproxen (NAPROSYN) 500 MG tablet  "Take 1 tablet (500 mg) by mouth 2 times daily (with meals) 60 tablet 3     Allergies   Allergen Reactions     Sulfa Drugs Nausea and Vomiting             Review of Systems   Constitutional: Negative for chills and fever.   HENT: Negative for congestion, ear pain, hearing loss and sore throat.    Eyes: Negative for pain and visual disturbance.   Respiratory: Negative for cough and shortness of breath.    Cardiovascular: Negative for chest pain, palpitations and peripheral edema.   Gastrointestinal: Negative for abdominal pain, constipation, diarrhea, heartburn, hematochezia and nausea.   Genitourinary: Negative for dysuria, frequency, genital sores, hematuria, impotence and urgency.   Musculoskeletal: Negative for arthralgias, joint swelling and myalgias.   Skin: Negative for rash.   Neurological: Negative for dizziness, weakness, headaches and paresthesias.   Psychiatric/Behavioral: Negative for mood changes. The patient is not nervous/anxious.      Answers for HPI/ROS submitted by the patient on 6/17/2022  RACHEL 7 TOTAL SCORE: 0        OBJECTIVE:   /80 (BP Location: Right arm, Patient Position: Sitting, Cuff Size: Adult Regular)   Pulse 66   Temp 97.7  F (36.5  C) (Tympanic)   Resp 16   Wt 99.8 kg (220 lb)   SpO2 97%   BMI 30.68 kg/m   Estimated body mass index is 30.68 kg/m  as calculated from the following:    Height as of 6/28/21: 1.803 m (5' 11\").    Weight as of this encounter: 99.8 kg (220 lb).  Physical Exam  GENERAL: healthy, alert and no distress  EYES: Eyes grossly normal to inspection, PERRL and conjunctivae and sclerae normal  HENT: ear canals and TM's normal, nose and mouth without ulcers or lesions  NECK: no adenopathy, no asymmetry, masses, or scars and thyroid normal to palpation  RESP: lungs clear to auscultation - no rales, rhonchi or wheezes  CV: regular rate and rhythm, normal S1 S2, no S3 or S4, no murmur, click or rub, no peripheral edema and peripheral pulses strong  ABDOMEN: soft, " nontender, no hepatosplenomegaly, no masses and bowel sounds normal  MS: no gross musculoskeletal defects noted, no edema  SKIN: no suspicious lesions or rashes  NEURO: Normal strength and tone, mentation intact and speech normal  PSYCH: mentation appears normal, affect normal/bright    Diagnostic Test Results:  Labs reviewed in Epic  Results for orders placed or performed in visit on 06/17/22   Basic Metabolic Panel     Status: Normal   Result Value Ref Range    Sodium 141 134 - 144 mmol/L    Potassium 3.6 3.5 - 5.1 mmol/L    Chloride 102 98 - 107 mmol/L    Carbon Dioxide (CO2) 31 21 - 31 mmol/L    Anion Gap 8 3 - 14 mmol/L    Urea Nitrogen 22 7 - 25 mg/dL    Creatinine 0.91 0.70 - 1.30 mg/dL    Calcium 10.0 8.6 - 10.3 mg/dL    Glucose 104 70 - 105 mg/dL    GFR Estimate 90 >60 mL/min/1.73m2   PSA Screen GH     Status: Abnormal   Result Value Ref Range    Prostate Specific Antigen Screen 4.40 (H) 0.00 - 4.00 ug/L    Narrative    The [employer] Access PSAS WHO assay is a two site immunoenzymatic   assay. Assay values obtained with different assay methods cannot be used   interchangeably due to differences in assay methods and reagent specificity.   Lipid Panel     Status: Abnormal   Result Value Ref Range    Cholesterol 106 <200 mg/dL    Triglycerides 186 (H) <150 mg/dL    Direct Measure HDL 30 23 - 92 mg/dL    LDL Cholesterol Calculated 39 <=100 mg/dL    Non HDL Cholesterol 76 <130 mg/dL    Patient Fasting > 8hrs? Unknown     Narrative    Cholesterol  Desirable:  <200 mg/dL    Triglycerides  Normal:  Less than 150 mg/dL  Borderline High:  150-199 mg/dL  High:  200-499 mg/dL  Very High:  Greater than or equal to 500 mg/dL    Direct Measure HDL  Female:  Greater than or equal to 50 mg/dL   Male:  Greater than or equal to 40 mg/dL    LDL Cholesterol  Desirable:  <100mg/dL  Above Desirable:  100-129 mg/dL   Borderline High:  130-159 mg/dL   High:  160-189 mg/dL   Very High:  >= 190 mg/dL    Non HDL Cholesterol  Desirable:  130  "mg/dL  Above Desirable:  130-159 mg/dL  Borderline High:  160-189 mg/dL  High:  190-219 mg/dL  Very High:  Greater than or equal to 220 mg/dL         ASSESSMENT / PLAN:       ICD-10-CM    1. Encounter for Medicare annual wellness exam  Z00.00    2. Acute pain of left knee  M25.562 naproxen (NAPROSYN) 500 MG tablet   3. Essential hypertension, benign  I10 Basic Metabolic Panel   4. Screening for prostate cancer  Z12.5 PSA Screen GH   5. Dyslipidemia  E78.5 Lipid Panel     The knee is a little worse lately, refilled the naproxen.  Other conditions stable, meds have been refilled already.  PSA has actually fallen slightly, making cancer much less likely.  Sees urology already.         COUNSELING:  Reviewed preventive health counseling, as reflected in patient instructions       Regular exercise       Healthy diet/nutrition       Prostate cancer screening    Estimated body mass index is 30.68 kg/m  as calculated from the following:    Height as of 6/28/21: 1.803 m (5' 11\").    Weight as of this encounter: 99.8 kg (220 lb).    Weight management plan: Discussed healthy diet and exercise guidelines    He reports that he has never smoked. He has never used smokeless tobacco.      Appropriate preventive services were discussed with this patient, including applicable screening as appropriate for cardiovascular disease, diabetes, osteopenia/osteoporosis, and glaucoma.  As appropriate for age/gender, discussed screening for colorectal cancer, prostate cancer, breast cancer, and cervical cancer. Checklist reviewing preventive services available has been given to the patient.    Reviewed patients plan of care and provided an AVS. The Basic Care Plan (routine screening as documented in Health Maintenance) for Marbin meets the Care Plan requirement. This Care Plan has been established and reviewed with the Patient.    Counseling Resources:  ATP IV Guidelines  Pooled Cohorts Equation Calculator  Breast Cancer Risk Calculator  Breast " Cancer: Medication to Reduce Risk  FRAX Risk Assessment  ICSI Preventive Guidelines  Dietary Guidelines for Americans, 2010  USDA's MyPlate  ASA Prophylaxis  Lung CA Screening    Darin Pappas MD  [facility]    Identified Health Risks:    The patient was counseled and encouraged to consider modifying their diet and eating habits. He was provided with information on recommended healthy diet options.  The patient was provided with written information regarding signs of hearing loss.

## 2022-07-14 ENCOUNTER — TELEPHONE (OUTPATIENT)
Dept: LAB | Facility: OTHER | Age: 72
End: 2022-07-14

## 2022-07-14 DIAGNOSIS — R97.20 ELEVATED PSA: Primary | ICD-10-CM

## 2022-07-14 NOTE — TELEPHONE ENCOUNTER
"Per last office visit  07/14/21 with Rupesh Dozier MD \"Plan  Follow up in 1 year with PSA.\"        Orders Placed    Dominique Bautista LPN on 7/14/2022 at 2:56 PM    "

## 2022-07-20 ENCOUNTER — LAB (OUTPATIENT)
Dept: LAB | Facility: OTHER | Age: 72
End: 2022-07-20
Attending: UROLOGY
Payer: COMMERCIAL

## 2022-07-20 ENCOUNTER — OFFICE VISIT (OUTPATIENT)
Dept: UROLOGY | Facility: OTHER | Age: 72
End: 2022-07-20
Attending: UROLOGY
Payer: MEDICARE

## 2022-07-20 VITALS
RESPIRATION RATE: 16 BRPM | WEIGHT: 223.4 LBS | BODY MASS INDEX: 31.16 KG/M2 | HEART RATE: 65 BPM | SYSTOLIC BLOOD PRESSURE: 122 MMHG | OXYGEN SATURATION: 97 % | DIASTOLIC BLOOD PRESSURE: 70 MMHG

## 2022-07-20 DIAGNOSIS — R97.20 ELEVATED PSA: ICD-10-CM

## 2022-07-20 DIAGNOSIS — R97.20 ELEVATED PSA: Primary | ICD-10-CM

## 2022-07-20 LAB — PSA SERPL-MCNC: 4.77 UG/L (ref 0–4)

## 2022-07-20 PROCEDURE — 36415 COLL VENOUS BLD VENIPUNCTURE: CPT | Mod: ZL

## 2022-07-20 PROCEDURE — G0463 HOSPITAL OUTPT CLINIC VISIT: HCPCS

## 2022-07-20 PROCEDURE — 99213 OFFICE O/P EST LOW 20 MIN: CPT | Performed by: UROLOGY

## 2022-07-20 PROCEDURE — 84153 ASSAY OF PSA TOTAL: CPT | Mod: ZL

## 2022-07-20 ASSESSMENT — PAIN SCALES - GENERAL: PAINLEVEL: NO PAIN (0)

## 2022-07-20 NOTE — NURSING NOTE
Chief Complaint   Patient presents with     Follow Up     1 year follow up elevated PSA      Patient presents to the clinic today for a 1 year follow up for elevated PSA    Review of Systems:    Weight loss:    No     Recent fever/chills:  No   Night sweats:   No  Current skin rash:  No   Recent hair loss:  No  Heat intolerance:  No   Cold intolerance:  No  Chest pain:   No   Palpitations:   No  Shortness of breath:  No   Wheezing:   No  Constipation:    No   Diarrhea:   No   Nausea:   No   Vomiting:   No   Kidney/side pain:  No   Back pain:   No  Frequent headaches:  No   Dizziness:     No  Leg swelling:   No   Calf pain:    No        Medication Reconciliation: completed   Dominique Bautista LPN  7/20/2022 10:43 AM

## 2022-07-20 NOTE — PROGRESS NOTES
Type of Visit  Established    Chief Complaint  Elevated PSA    HPI  Mr. Adams is a 72 y.o. male (+FH PCa with -TRUS) following up with elevated PSA.  He does have a family history of prostate cancer.  His brother was diagnosed 12 years ago in his 60s and underwent prostatectomy.  Today he reports no significant changes in health of the past year.  His urinary symptoms have remained stable.  He underwent a PSA recently and follows up to discuss results.  He denies new urinary symptoms, unintentional weight loss, bone or pelvic pain.      Review of Systems  I reviewed the ROS the patient today.    Nursing Notes:   Dominique Bautista LPN  7/20/2022 10:44 AM  Sign at exiting of workspace  Chief Complaint   Patient presents with     Follow Up     1 year follow up elevated PSA      Patient presents to the clinic today for a 1 year follow up for elevated PSA    Review of Systems:    Weight loss:    No     Recent fever/chills:  No   Night sweats:   No  Current skin rash:  No   Recent hair loss:  No  Heat intolerance:  No   Cold intolerance:  No  Chest pain:   No   Palpitations:   No  Shortness of breath:  No   Wheezing:   No  Constipation:    No   Diarrhea:   No   Nausea:   No   Vomiting:   No   Kidney/side pain:  No   Back pain:   No  Frequent headaches:  No   Dizziness:     No  Leg swelling:   No   Calf pain:    No        Medication Reconciliation: completed   Dominique Bautista LPN  7/20/2022 10:43 AM     Physical Exam  /70 (BP Location: Right arm, Patient Position: Sitting, Cuff Size: Adult Large)   Pulse 65   Resp 16   Wt 101.3 kg (223 lb 6.4 oz)   SpO2 97%   BMI 31.16 kg/m    Constitutional: NAD, WDWN.  Cardiovascular: Regular rate.  Pulmonary/Chest: Respirations are even and non-labored bilaterally.  Abdominal: Soft. No distension, tenderness, masses or guarding. No CVA tenderness.  Extremities: CAROL x 4, Warm. No clubbing.  No cyanosis.    Skin: Pink, warm and dry.  No rashes noted.  Prostate:   Normal rectal tone,  40-50 grams.      Symmetric, non-tender, anodular and no induration - stable    Labs   06/17/22 11:04 07/20/22 08:34   PSA 4.40 (H) 4.77 (H)     Results for ANNITA ADAMS (MRN [id number]) as of 7/14/2021 10:58   12/20/2019 14:17 7/29/2020 09:04 4/21/2021 09:18 7/14/2021 08:37   PSA 5.082 (H) 4.174 (H) 4.328 (H) 4.93 (H)     Results for ANNITA ADAMS (MRN [id number]) as of 7/29/2020 11:18   7/25/2019 08:07   PSA 5.462 (H)     Results for ANNITA ADAMS (MRN [id number]) as of 7/25/2019 10:48   1/16/2018 08:50 7/23/2018 07:08   PSA 5.221 (H) 2.817     Results for ANNITA ADAMS (MRN [id number]) as of 1/18/2018 09:55   11/20/2014 11:26 5/22/2015 07:32 6/13/2016 09:27 6/29/2017 07:50   PSA 1.580 2.212 1.769 4.430 (H)     Pathology  7/3/2017  Negative 12 cores out of 12 cores  Calculated prostate volume based on TRUS: 55 grams    Assessment  Mr. Adams is a 72 y.o. male (+FH PCa with -TRUS) following up with elevated PSA.  Patient prioritizes PSA screening given family history of prostate cancer.    Plan  Follow up annually with PSA.

## 2023-01-02 DIAGNOSIS — Z86.0101 H/O ADENOMATOUS POLYP OF COLON: Primary | ICD-10-CM

## 2023-01-02 RX ORDER — BISACODYL 5 MG
TABLET, DELAYED RELEASE (ENTERIC COATED) ORAL
Qty: 2 TABLET | Refills: 0 | Status: ON HOLD | OUTPATIENT
Start: 2023-01-02 | End: 2023-01-30

## 2023-01-02 RX ORDER — POLYETHYLENE GLYCOL 3350, SODIUM CHLORIDE, SODIUM BICARBONATE, POTASSIUM CHLORIDE 420; 11.2; 5.72; 1.48 G/4L; G/4L; G/4L; G/4L
4000 POWDER, FOR SOLUTION ORAL ONCE
Qty: 4000 ML | Refills: 0 | Status: SHIPPED | OUTPATIENT
Start: 2023-01-02 | End: 2023-01-02

## 2023-01-25 PROBLEM — Z86.0101 H/O ADENOMATOUS POLYP OF COLON: Status: ACTIVE | Noted: 2018-01-15

## 2023-01-25 PROBLEM — Z00.00 HEALTH CARE MAINTENANCE: Status: RESOLVED | Noted: 2018-01-04 | Resolved: 2023-01-25

## 2023-01-30 ENCOUNTER — HOSPITAL ENCOUNTER (OUTPATIENT)
Facility: OTHER | Age: 73
Discharge: HOME | End: 2023-01-30
Attending: SURGERY | Admitting: SURGERY
Payer: MEDICARE

## 2023-01-30 ENCOUNTER — ANESTHESIA (OUTPATIENT)
Dept: SURGERY | Facility: OTHER | Age: 73
End: 2023-01-30
Payer: MEDICARE

## 2023-01-30 ENCOUNTER — ANESTHESIA EVENT (OUTPATIENT)
Dept: SURGERY | Facility: OTHER | Age: 73
End: 2023-01-30
Payer: MEDICARE

## 2023-01-30 VITALS
SYSTOLIC BLOOD PRESSURE: 113 MMHG | WEIGHT: 220 LBS | OXYGEN SATURATION: 93 % | DIASTOLIC BLOOD PRESSURE: 76 MMHG | TEMPERATURE: 97.2 F | HEIGHT: 71 IN | RESPIRATION RATE: 16 BRPM | BODY MASS INDEX: 30.8 KG/M2 | HEART RATE: 62 BPM

## 2023-01-30 PROBLEM — K63.5 COLON POLYP: Status: ACTIVE | Noted: 2023-01-30

## 2023-01-30 PROCEDURE — 258N000003 HC RX IP 258 OP 636: Performed by: SURGERY

## 2023-01-30 PROCEDURE — 250N000009 HC RX 250: Performed by: NURSE ANESTHETIST, CERTIFIED REGISTERED

## 2023-01-30 PROCEDURE — 45385 COLONOSCOPY W/LESION REMOVAL: CPT | Mod: PT | Performed by: SURGERY

## 2023-01-30 PROCEDURE — 999N000010 HC STATISTIC ANES STAT CODE-CRNA PER MINUTE: Performed by: SURGERY

## 2023-01-30 PROCEDURE — 99100 ANES PT EXTEME AGE<1 YR&>70: CPT | Performed by: NURSE ANESTHETIST, CERTIFIED REGISTERED

## 2023-01-30 PROCEDURE — 88305 TISSUE EXAM BY PATHOLOGIST: CPT

## 2023-01-30 PROCEDURE — 250N000011 HC RX IP 250 OP 636: Performed by: NURSE ANESTHETIST, CERTIFIED REGISTERED

## 2023-01-30 PROCEDURE — 45380 COLONOSCOPY AND BIOPSY: CPT | Performed by: SURGERY

## 2023-01-30 PROCEDURE — 45385 COLONOSCOPY W/LESION REMOVAL: CPT | Performed by: NURSE ANESTHETIST, CERTIFIED REGISTERED

## 2023-01-30 RX ORDER — NALOXONE HYDROCHLORIDE 0.4 MG/ML
0.2 INJECTION, SOLUTION INTRAMUSCULAR; INTRAVENOUS; SUBCUTANEOUS
Status: DISCONTINUED | OUTPATIENT
Start: 2023-01-30 | End: 2023-01-30 | Stop reason: HOSPADM

## 2023-01-30 RX ORDER — SODIUM CHLORIDE, SODIUM LACTATE, POTASSIUM CHLORIDE, CALCIUM CHLORIDE 600; 310; 30; 20 MG/100ML; MG/100ML; MG/100ML; MG/100ML
INJECTION, SOLUTION INTRAVENOUS CONTINUOUS
Status: DISCONTINUED | OUTPATIENT
Start: 2023-01-30 | End: 2023-01-30 | Stop reason: HOSPADM

## 2023-01-30 RX ORDER — NALOXONE HYDROCHLORIDE 0.4 MG/ML
0.4 INJECTION, SOLUTION INTRAMUSCULAR; INTRAVENOUS; SUBCUTANEOUS
Status: DISCONTINUED | OUTPATIENT
Start: 2023-01-30 | End: 2023-01-30 | Stop reason: HOSPADM

## 2023-01-30 RX ORDER — LIDOCAINE 40 MG/G
CREAM TOPICAL
Status: DISCONTINUED | OUTPATIENT
Start: 2023-01-30 | End: 2023-01-30 | Stop reason: HOSPADM

## 2023-01-30 RX ORDER — FLUMAZENIL 0.1 MG/ML
0.2 INJECTION, SOLUTION INTRAVENOUS
Status: DISCONTINUED | OUTPATIENT
Start: 2023-01-30 | End: 2023-01-30 | Stop reason: HOSPADM

## 2023-01-30 RX ORDER — PROPOFOL 10 MG/ML
INJECTION, EMULSION INTRAVENOUS CONTINUOUS PRN
Status: DISCONTINUED | OUTPATIENT
Start: 2023-01-30 | End: 2023-01-30

## 2023-01-30 RX ORDER — LIDOCAINE HYDROCHLORIDE 20 MG/ML
INJECTION, SOLUTION INFILTRATION; PERINEURAL PRN
Status: DISCONTINUED | OUTPATIENT
Start: 2023-01-30 | End: 2023-01-30

## 2023-01-30 RX ORDER — ONDANSETRON 2 MG/ML
4 INJECTION INTRAMUSCULAR; INTRAVENOUS
Status: DISCONTINUED | OUTPATIENT
Start: 2023-01-30 | End: 2023-01-30 | Stop reason: HOSPADM

## 2023-01-30 RX ORDER — PROPOFOL 10 MG/ML
INJECTION, EMULSION INTRAVENOUS PRN
Status: DISCONTINUED | OUTPATIENT
Start: 2023-01-30 | End: 2023-01-30

## 2023-01-30 RX ADMIN — LIDOCAINE HYDROCHLORIDE 40 MG: 20 INJECTION, SOLUTION INFILTRATION; PERINEURAL at 09:57

## 2023-01-30 RX ADMIN — PROPOFOL 150 MG: 10 INJECTION, EMULSION INTRAVENOUS at 09:57

## 2023-01-30 RX ADMIN — PROPOFOL 150 MCG/KG/MIN: 10 INJECTION, EMULSION INTRAVENOUS at 09:57

## 2023-01-30 RX ADMIN — SODIUM CHLORIDE, SODIUM LACTATE, POTASSIUM CHLORIDE, AND CALCIUM CHLORIDE: 600; 310; 30; 20 INJECTION, SOLUTION INTRAVENOUS at 09:56

## 2023-01-30 ASSESSMENT — ACTIVITIES OF DAILY LIVING (ADL): ADLS_ACUITY_SCORE: 35

## 2023-01-30 NOTE — DISCHARGE INSTRUCTIONS
[facility]  Adult Discharge Orders & Instructions    ________________________________________________________________          For 12 hours after surgery  Get plenty of rest.  A responsible adult must stay with you for at least 12 hours after you leave the hospital.   You may feel lightheaded.  IF so, sit for a few minutes before standing.  Have someone help you get up.   You may have a slight fever. Call the doctor if your fever is over 101 F (38.3 C) (taken under the tongue) or lasts longer than 24 hours.  You may have a dry mouth, a sore throat, muscle aches or trouble sleeping.  These should go away after 24 hours.  Do not make important or legal decisions.  6.   Do not drive or use heavy equipment.  If you have weakness or tingling, don't drive or use heavy equipment until this feeling goes away.    To contact a doctor, call   [phone]_______________________

## 2023-01-30 NOTE — ANESTHESIA CARE TRANSFER NOTE
Patient: Marbin Adams    Procedure: Procedure(s):  COLONOSCOPY, WITH POLYPECTOMY AND BIOPSY       Diagnosis: History of colon polyps [Z86.010]  Diagnosis Additional Information: No value filed.    Anesthesia Type:   MAC     Note:    Oropharynx: oropharynx clear of all foreign objects and spontaneously breathing  Level of Consciousness: drowsy  Oxygen Supplementation: room air    Independent Airway: airway patency satisfactory and stable  Dentition: dentition unchanged  Vital Signs Stable: post-procedure vital signs reviewed and stable  Report to RN Given: handoff report given  Patient transferred to: Phase II    Handoff Report: Identifed the Patient, Identified the Reponsible Provider, Reviewed the pertinent medical history, Discussed the surgical course, Reviewed Intra-OP anesthesia mangement and issues during anesthesia, Set expectations for post-procedure period and Allowed opportunity for questions and acknowledgement of understanding      Vitals:  Vitals Value Taken Time   BP     Temp     Pulse     Resp     SpO2 91 % 01/30/23 1022   Vitals shown include unvalidated device data.    Electronically Signed By: GINA Bourgeois CRNA  January 30, 2023  10:23 AM

## 2023-01-30 NOTE — ANESTHESIA POSTPROCEDURE EVALUATION
Patient: Marbin Adams    Procedure: Procedure(s):  COLONOSCOPY, WITH POLYPECTOMY AND BIOPSY       Anesthesia Type:  MAC    Note:  Disposition: Outpatient   Postop Pain Control: Uneventful            Sign Out: Well controlled pain   PONV: No   Neuro/Psych: Uneventful            Sign Out: Acceptable/Baseline neuro status   Airway/Respiratory: Uneventful            Sign Out: Acceptable/Baseline resp. status   CV/Hemodynamics: Uneventful            Sign Out: Acceptable CV status; No obvious hypovolemia; No obvious fluid overload   Other NRE: NONE   DID A NON-ROUTINE EVENT OCCUR? No           Last vitals:  Vitals Value Taken Time   BP 94/67 01/30/23 1040   Temp     Pulse 62 01/30/23 1040   Resp 14 01/30/23 1020   SpO2 93 % 01/30/23 1041   Vitals shown include unvalidated device data.    Electronically Signed By: GINA Bourgeois CRNA  January 30, 2023  10:42 AM

## 2023-01-30 NOTE — ANESTHESIA PREPROCEDURE EVALUATION
Anesthesia Pre-Procedure Evaluation    Patient: Marbin Adams   MRN: [id number] : 1950        Procedure : Procedure(s):  COLONOSCOPY          Past Medical History:   Diagnosis Date     Essential (primary) hypertension     No Comments Provided      Past Surgical History:   Procedure Laterality Date     CLOSED REDUCTION ANKLE      ,right     COLONOSCOPY      ,Due      COLONOSCOPY  01/15/2018    01/15/2018,F/U      OTHER SURGICAL HISTORY      2017,[id number],PROSTATE BIOPSY,Negative     TONSILLECTOMY            Allergies   Allergen Reactions     Sulfa Drugs Nausea and Vomiting      Social History     Tobacco Use     Smoking status: Never     Smokeless tobacco: Never   Substance Use Topics     Alcohol use: Yes     Alcohol/week: 0.8 standard drinks     Comment: Alcoholic Drinks/day: very occasionally      Wt Readings from Last 1 Encounters:   23 99.8 kg (220 lb)        Anesthesia Evaluation   Pt has had prior anesthetic.     No history of anesthetic complications       ROS/MED HX  ENT/Pulmonary:  - neg pulmonary ROS     Neurologic:  - neg neurologic ROS     Cardiovascular:     (+) Dyslipidemia hypertension-----    METS/Exercise Tolerance: >4 METS    Hematologic:  - neg hematologic  ROS     Musculoskeletal:  - neg musculoskeletal ROS     GI/Hepatic:  - neg GI/hepatic ROS     Renal/Genitourinary:  - neg Renal ROS     Endo:  - neg endo ROS     Psychiatric/Substance Use:  - neg psychiatric ROS     Infectious Disease:  - neg infectious disease ROS     Malignancy:  - neg malignancy ROS     Other:  - neg other ROS          Physical Exam    Airway  airway exam normal      Mallampati: II   TM distance: > 3 FB   Neck ROM: full   Mouth opening: > 3 cm    Respiratory Devices and Support         Dental       (+) Completely normal teeth      Cardiovascular   cardiovascular exam normal       Rhythm and rate: regular and normal     Pulmonary   pulmonary exam normal        breath sounds clear to  auscultation           OUTSIDE LABS:  CBC: No results found for: WBC, HGB, HCT, PLT  BMP:   Lab Results   Component Value Date     06/17/2022     04/21/2021    POTASSIUM 3.6 06/17/2022    POTASSIUM 3.8 04/21/2021    CHLORIDE 102 06/17/2022    CHLORIDE 101 04/21/2021    CO2 31 06/17/2022    CO2 33 (H) 04/21/2021    BUN 22 06/17/2022    BUN 18 04/21/2021    CR 0.91 06/17/2022    CR 0.83 04/21/2021     06/17/2022     04/21/2021     COAGS: No results found for: PTT, INR, FIBR  POC: No results found for: BGM, HCG, HCGS  HEPATIC:   Lab Results   Component Value Date    ALBUMIN 4.8 12/03/2015    PROTTOTAL 7.8 12/03/2015    ALKPHOS 55 12/03/2015    BILITOTAL 0.7 12/03/2015     OTHER:   Lab Results   Component Value Date    DALE 10.0 06/17/2022       Anesthesia Plan    ASA Status:  2   NPO Status:  NPO Appropriate    Anesthesia Type: MAC.   Induction: Propofol.   Maintenance: Balanced.        Consents    Anesthesia Plan(s) and associated risks, benefits, and realistic alternatives discussed. Questions answered and patient/representative(s) expressed understanding.     - Discussed: Risks, Benefits and Alternatives for BOTH SEDATION and the PROCEDURE were discussed     - Discussed with:  Patient      - Extended Intubation/Ventilatory Support Discussed: No.      - Patient is DNR/DNI Status: No    Use of blood products discussed: No .     Postoperative Care            Comments:                GNIA ROBLEDO CRNA

## 2023-01-30 NOTE — OP NOTE
PROCEDURE NOTE    DATE OF SERVICE: 1/30/2023    SURGEON: Jerel Burkett MD    PRE-OP DIAGNOSIS:      History of Polyps        POST-OP DIAGNOSIS:  Same  Sigmoid Diverticulosis  Polyp at     PROCEDURE:     Colonoscopy with snare polypectomy        ANESTHESIA:  CHRISTIANO Cowart CRNA    INDICATION FOR THE PROCEDURE: The patient is a 72 year old male with h/o polyps . The patient has no other complaints  . After explaining the risks to include bleeding, perforation, potential inability toreach the cecum, the patient wished to proceed.    PROCEDURE:After adequate sedation, the patient was in the left lateral decubitus position.  Rectal exam was performed.  There was normal tone and  palpable  Anal tag.  The colonoscope was introduced into the rectum and advanced to the cecum with Moderate difficulty.  The patient's prep was fair.  The terminal cecum was reached.  The cecum, ascending, transverse, descending and sigmoid colon was with sigmoid diverticulosis. A flat about 1 cm polyp at  was completely removed by snare .  The scope was retroflexed in the rectum.  The rectum was remarkable an anal tag and diffuse hemorhoidal veins .  The scope was straightened and removed.  The patient tolerated the procedure well.     ESTIMATED BLOOD LOSS: none    COMPLICATIONS:  None    TISSUE REMOVED:  Yes    RECOMMEND:      Follow-up pending pathology  Fiber  Given literature on diverticulosis    Jerel Burkett MD FACS

## 2023-01-30 NOTE — H&P
"History and Physical    CHIEF COMPLAINT / REASON FOR PROCEDURE:  H/o polyps    PERTINENT HISTORY   Patient is a 72 year old male who presents today for colonoscopy for h/o polyps.   Last colonoscopy 2018.  Patient has no complaints.    Past Medical History:   Diagnosis Date     Essential (primary) hypertension     No Comments Provided     Past Surgical History:   Procedure Laterality Date     CLOSED REDUCTION ANKLE      1962,right     COLONOSCOPY      2007,Due 2017     COLONOSCOPY  01/15/2018    01/15/2018,F/U 2023     OTHER SURGICAL HISTORY      07/2017,[id number],PROSTATE BIOPSY,Negative     TONSILLECTOMY      1957       Bleeding tendencies:  No    ALLERGIES/SENSITIVITIES:   Allergies   Allergen Reactions     Sulfa Drugs Nausea and Vomiting        CURRENT MEDICATIONS:    Prior to Admission medications    Medication Sig Start Date End Date Taking? Authorizing Provider   atenolol (TENORMIN) 100 MG tablet TAKE 1 TABLET (100 MG) BY MOUTH DAILY 5/10/22   Drain Pappas MD   atorvastatin (LIPITOR) 40 MG tablet TAKE 1 TABLET (40 MG) BY MOUTH DAILY 5/10/22   Darin Pappas MD   bisacodyl (DULCOLAX) 5 MG EC tablet Use as directed per colonoscopy prep. 1/2/23   Jerel Burkett MD   hydrochlorothiazide (HYDRODIURIL) 25 MG tablet TAKE 1 TABLET (25 MG) BY MOUTH DAILY 5/10/22   Darin Pappas MD   lisinopril (ZESTRIL) 10 MG tablet TAKE 1 TABLET (10 MG) BY MOUTH DAILY 5/10/22   Darin Pappas MD   Multiple Vitamins-Minerals (THERA-M) TABS Take 1 tablet by mouth daily    Reported, Patient   naproxen (NAPROSYN) 500 MG tablet Take 1 tablet (500 mg) by mouth 2 times daily (with meals) 6/17/22   Darin Pappas MD       Physical Exam:  Temp 97.2  F (36.2  C) (Tympanic)   Ht 1.803 m (5' 11\")   Wt 99.8 kg (220 lb)   SpO2 98%   BMI 30.68 kg/m    EXAM:  Chest/Respiratory Exam: Normal - Clear to auscultation without rales, rhonchi, or wheezing.  Cardiovascular Exam: normal, regular rate and rhythm        PLAN: COLONOSCOPY .  Patient " understands risks of bleeding, perforation, potential inability to reach cecum, aspiration and wishes to proceed. MAC needed for age.

## 2023-01-30 NOTE — OR NURSING
Marbin has been discharged to home at 1110 via ambulatory accompanied by nurse and wife.    Written discharge instructions were provided to wife and patient.  Prescriptions were none.      Patient and adult caring for them verbalize understanding of discharge instructions including no driving until tomorrow and no longer taking narcotic pain medications - no operating mechanical equipment and no making any important decisions.They understand reason for discharge, and necessary follow-up appointments.

## 2023-02-01 PROBLEM — K63.5 COLON POLYP: Status: RESOLVED | Noted: 2023-01-30 | Resolved: 2023-02-01

## 2023-02-01 LAB
PATH REPORT.COMMENTS IMP SPEC: NORMAL
PATH REPORT.FINAL DX SPEC: NORMAL
PATH REPORT.RELEVANT HX SPEC: NORMAL
PHOTO IMAGE: NORMAL

## 2023-05-04 DIAGNOSIS — I10 ESSENTIAL HYPERTENSION, BENIGN: ICD-10-CM

## 2023-05-04 DIAGNOSIS — E78.5 DYSLIPIDEMIA: ICD-10-CM

## 2023-05-04 RX ORDER — ATORVASTATIN CALCIUM 40 MG/1
40 TABLET, FILM COATED ORAL DAILY
Qty: 90 TABLET | Refills: 0 | Status: SHIPPED | OUTPATIENT
Start: 2023-05-04 | End: 2023-08-10

## 2023-05-04 RX ORDER — LISINOPRIL 10 MG/1
10 TABLET ORAL DAILY
Qty: 90 TABLET | Refills: 0 | Status: SHIPPED | OUTPATIENT
Start: 2023-05-04 | End: 2023-08-10

## 2023-05-04 RX ORDER — HYDROCHLOROTHIAZIDE 25 MG/1
25 TABLET ORAL DAILY
Qty: 90 TABLET | Refills: 0 | Status: SHIPPED | OUTPATIENT
Start: 2023-05-04 | End: 2023-08-10

## 2023-05-04 RX ORDER — ATENOLOL 100 MG/1
100 TABLET ORAL DAILY
Qty: 90 TABLET | Refills: 0 | Status: SHIPPED | OUTPATIENT
Start: 2023-05-04 | End: 2023-08-10

## 2023-05-04 NOTE — TELEPHONE ENCOUNTER
"  Last Prescription Date: 5/10/22  Last Qty/Refills: 90 / 3  Last Office Visit: 6/17/22  Future Office Visit: none     Corinne R Thayer, RN on 5/4/2023 at 3:12 PM    Requested Prescriptions   Pending Prescriptions Disp Refills     atorvastatin (LIPITOR) 40 MG tablet [Pharmacy Med Name: ATORVASTATIN 40MG TABLET] 90 tablet 4     Sig: TAKE 1 TABLET (40 MG) BY MOUTH DAILY       Statins Protocol Passed - 5/4/2023  1:00 AM        Passed - LDL on file in past 12 months     Recent Labs   Lab Test 06/17/22  1104   LDL 39             Passed - No abnormal creatine kinase in past 12 months     No lab results found.             Passed - Recent (12 mo) or future (30 days) visit within the authorizing provider's specialty     Patient has had an office visit with the authorizing provider or a provider within the authorizing providers department within the previous 12 mos or has a future within next 30 days. See \"Patient Info\" tab in [email or web address], or \"Choose Columns\" in Meds & Orders section of the refill encounter.              Passed - Medication is active on med list        Passed - Patient is age 18 or older           atenolol (TENORMIN) 100 MG tablet [Pharmacy Med Name: ATENOLOL 100MG TABLET] 90 tablet 4     Sig: TAKE 1 TABLET (100 MG) BY MOUTH DAILY       Beta-Blockers Protocol Passed - 5/4/2023  1:00 AM        Passed - Blood pressure under 140/90 in past 12 months     BP Readings from Last 3 Encounters:   01/30/23 113/76   07/20/22 122/70   06/17/22 124/80                 Passed - Patient is age 6 or older        Passed - Recent (12 mo) or future (30 days) visit within the authorizing provider's specialty     Patient has had an office visit with the authorizing provider or a provider within the authorizing providers department within the previous 12 mos or has a future within next 30 days. See \"Patient Info\" tab in inbasket, or \"Choose Columns\" in Meds & Orders section of the refill encounter.              Passed - Medication is " "active on med list           hydrochlorothiazide (HYDRODIURIL) 25 MG tablet [Pharmacy Med Name: HYDROCHLOROTHIAZIDE 25MG TAB] 90 tablet 4     Sig: TAKE 1 TABLET (25 MG) BY MOUTH DAILY       Diuretics (Including Combos) Protocol Passed - 5/4/2023  1:00 AM        Passed - Blood pressure under 140/90 in past 12 months     BP Readings from Last 3 Encounters:   01/30/23 113/76   07/20/22 122/70   06/17/22 124/80                 Passed - Recent (12 mo) or future (30 days) visit within the authorizing provider's specialty     Patient has had an office visit with the authorizing provider or a provider within the authorizing providers department within the previous 12 mos or has a future within next 30 days. See \"Patient Info\" tab in inbasket, or \"Choose Columns\" in Meds & Orders section of the refill encounter.              Passed - Medication is active on med list        Passed - Patient is age 18 or older        Passed - Normal serum creatinine on file in past 12 months     Recent Labs   Lab Test 06/17/22  1104   CR 0.91              Passed - Normal serum potassium on file in past 12 months     Recent Labs   Lab Test 06/17/22  1104   POTASSIUM 3.6                    Passed - Normal serum sodium on file in past 12 months     Recent Labs   Lab Test 06/17/22  1104                    lisinopril (ZESTRIL) 10 MG tablet [Pharmacy Med Name: LISINOPRIL 10MG TABLET] 90 tablet 4     Sig: TAKE 1 TABLET (10 MG) BY MOUTH DAILY       ACE Inhibitors (Including Combos) Protocol Passed - 5/4/2023  1:00 AM        Passed - Blood pressure under 140/90 in past 12 months     BP Readings from Last 3 Encounters:   01/30/23 113/76   07/20/22 122/70   06/17/22 124/80                 Passed - Recent (12 mo) or future (30 days) visit within the authorizing provider's specialty     Patient has had an office visit with the authorizing provider or a provider within the authorizing providers department within the previous 12 mos or has a future " "within next 30 days. See \"Patient Info\" tab in inbasket, or \"Choose Columns\" in Meds & Orders section of the refill encounter.              Passed - Medication is active on med list        Passed - Patient is age 18 or older        Passed - Normal serum creatinine on file in past 12 months     Recent Labs   Lab Test 06/17/22  1104   CR 0.91       Ok to refill medication if creatinine is low          Passed - Normal serum potassium on file in past 12 months     Recent Labs   Lab Test 06/17/22  1104   POTASSIUM 3.6                  "

## 2023-07-18 DIAGNOSIS — M25.562 ACUTE PAIN OF LEFT KNEE: ICD-10-CM

## 2023-07-19 RX ORDER — NAPROXEN 500 MG/1
500 TABLET ORAL 2 TIMES DAILY WITH MEALS
Qty: 60 TABLET | Refills: 3 | Status: SHIPPED | OUTPATIENT
Start: 2023-07-19 | End: 2023-10-18

## 2023-08-06 DIAGNOSIS — I10 ESSENTIAL HYPERTENSION, BENIGN: ICD-10-CM

## 2023-08-06 DIAGNOSIS — E78.5 DYSLIPIDEMIA: ICD-10-CM

## 2023-08-09 NOTE — TELEPHONE ENCOUNTER
"Pt due for annual exam. Routing to provider for refill consideration. Routing to Unit scheduling pool, to assist Pt in scheduling appointment.      Last Prescription Date: 5/04/2023  Last Qty/Refills: 90 / R-0  Last Office Visit: 6/17/2022  Future Office Visit: None     Requested Prescriptions   Pending Prescriptions Disp Refills    atenolol (TENORMIN) 100 MG tablet [Pharmacy Med Name: ATENOLOL 100MG TABLET] 90 tablet 0     Sig: TAKE 1 TABLET (100 MG) BY MOUTH DAILY       Beta-Blockers Protocol Failed - 8/6/2023  5:00 AM        Failed - Recent (12 mo) or future (30 days) visit within the authorizing provider's specialty     Patient has had an office visit with the authorizing provider or a provider within the authorizing providers department within the previous 12 mos or has a future within next 30 days. See \"Patient Info\" tab in inbasket, or \"Choose Columns\" in Meds & Orders section of the refill encounter.           Last Prescription Date: 5/04/2023  Last Qty/Refills: 90 / R-0  Last Office Visit: 6/17/2022  Future Office Visit: None       lisinopril (ZESTRIL) 10 MG tablet [Pharmacy Med Name: LISINOPRIL 10MG TABLET] 90 tablet 0     Sig: TAKE 1 TABLET (10 MG) BY MOUTH DAILY       ACE Inhibitors (Including Combos) Protocol Failed - 8/6/2023  5:00 AM        Failed - Recent (12 mo) or future (30 days) visit within the authorizing provider's specialty     Patient has had an office visit with the authorizing provider or a provider within the authorizing providers department within the previous 12 mos or has a future within next 30 days. See \"Patient Info\" tab in inbasket, or \"Choose Columns\" in Meds & Orders section of the refill encounter.              Failed - Normal serum creatinine on file in past 12 months     Recent Labs   Lab Test 06/17/22  1104   CR 0.91       Ok to refill medication if creatinine is low          Failed - Normal serum potassium on file in past 12 months     Recent Labs   Lab Test 06/17/22  1104 " "  POTASSIUM 3.6          Last Prescription Date: 5/04/2023  Last Qty/Refills: 90 / R-0  Last Office Visit: 6/17/2022  Future Office Visit: None       atorvastatin (LIPITOR) 40 MG tablet [Pharmacy Med Name: ATORVASTATIN 40MG TABLET] 90 tablet 0     Sig: TAKE 1 TABLET (40 MG) BY MOUTH DAILY       Statins Protocol Failed - 8/6/2023  5:00 AM        Failed - LDL on file in past 12 months     Recent Labs   Lab Test 06/17/22  1104   LDL 39             Failed - Recent (12 mo) or future (30 days) visit within the authorizing provider's specialty     Patient has had an office visit with the authorizing provider or a provider within the authorizing providers department within the previous 12 mos or has a future within next 30 days. See \"Patient Info\" tab in inbasket, or \"Choose Columns\" in Meds & Orders section of the refill encounter.           Last Prescription Date: 5/04/2023  Last Qty/Refills: 90 / R-0  Last Office Visit: 6/17/2022  Future Office Visit: None       hydrochlorothiazide (HYDRODIURIL) 25 MG tablet [Pharmacy Med Name: HYDROCHLOROTHIAZIDE 25MG TAB] 90 tablet 0     Sig: TAKE 1 TABLET (25 MG) BY MOUTH DAILY       Diuretics (Including Combos) Protocol Failed - 8/6/2023  5:00 AM        Failed - Recent (12 mo) or future (30 days) visit within the authorizing provider's specialty     Patient has had an office visit with the authorizing provider or a provider within the authorizing providers department within the previous 12 mos or has a future within next 30 days. See \"Patient Info\" tab in inbasket, or \"Choose Columns\" in Meds & Orders section of the refill encounter.              Failed - Normal serum creatinine on file in past 12 months     Recent Labs   Lab Test 06/17/22  1104   CR 0.91              Failed - Normal serum potassium on file in past 12 months     Recent Labs   Lab Test 06/17/22  1104   POTASSIUM 3.6                    Failed - Normal serum sodium on file in past 12 months     Recent Labs   Lab Test " 06/17/22  1104                 Akua Merrill RN on 8/9/2023 at 1:02 PM

## 2023-08-10 RX ORDER — HYDROCHLOROTHIAZIDE 25 MG/1
25 TABLET ORAL DAILY
Qty: 90 TABLET | Refills: 0 | Status: SHIPPED | OUTPATIENT
Start: 2023-08-10 | End: 2023-10-18

## 2023-08-10 RX ORDER — ATORVASTATIN CALCIUM 40 MG/1
40 TABLET, FILM COATED ORAL DAILY
Qty: 90 TABLET | Refills: 0 | Status: SHIPPED | OUTPATIENT
Start: 2023-08-10 | End: 2023-10-18

## 2023-08-10 RX ORDER — ATENOLOL 100 MG/1
100 TABLET ORAL DAILY
Qty: 90 TABLET | Refills: 0 | Status: SHIPPED | OUTPATIENT
Start: 2023-08-10 | End: 2023-10-18

## 2023-08-10 RX ORDER — LISINOPRIL 10 MG/1
10 TABLET ORAL DAILY
Qty: 90 TABLET | Refills: 0 | Status: SHIPPED | OUTPATIENT
Start: 2023-08-10 | End: 2023-10-18

## 2023-10-15 ASSESSMENT — ACTIVITIES OF DAILY LIVING (ADL): CURRENT_FUNCTION: NO ASSISTANCE NEEDED

## 2023-10-15 ASSESSMENT — ENCOUNTER SYMPTOMS
EYE PAIN: 0
COUGH: 0
ARTHRALGIAS: 1
DIZZINESS: 0
CHILLS: 0
WEAKNESS: 0
HEARTBURN: 0
HEADACHES: 0
MYALGIAS: 0
DIARRHEA: 0
CONSTIPATION: 0
SORE THROAT: 0
NAUSEA: 0
PALPITATIONS: 0
JOINT SWELLING: 0
HEMATOCHEZIA: 0
SHORTNESS OF BREATH: 0
NERVOUS/ANXIOUS: 0
ABDOMINAL PAIN: 0
FEVER: 0
DYSURIA: 0
PARESTHESIAS: 0
FREQUENCY: 0
HEMATURIA: 0

## 2023-10-18 ENCOUNTER — OFFICE VISIT (OUTPATIENT)
Dept: FAMILY MEDICINE | Facility: OTHER | Age: 73
End: 2023-10-18
Attending: FAMILY MEDICINE
Payer: COMMERCIAL

## 2023-10-18 VITALS
OXYGEN SATURATION: 98 % | HEART RATE: 62 BPM | HEIGHT: 70 IN | BODY MASS INDEX: 32.3 KG/M2 | WEIGHT: 225.6 LBS | RESPIRATION RATE: 14 BRPM | TEMPERATURE: 97.4 F | SYSTOLIC BLOOD PRESSURE: 126 MMHG | DIASTOLIC BLOOD PRESSURE: 82 MMHG

## 2023-10-18 DIAGNOSIS — Z23 NEED FOR TDAP VACCINATION: ICD-10-CM

## 2023-10-18 DIAGNOSIS — Z00.00 ENCOUNTER FOR MEDICARE ANNUAL WELLNESS EXAM: Primary | ICD-10-CM

## 2023-10-18 DIAGNOSIS — Z29.11 NEED FOR VACCINATION AGAINST RESPIRATORY SYNCYTIAL VIRUS: ICD-10-CM

## 2023-10-18 DIAGNOSIS — E78.5 DYSLIPIDEMIA: ICD-10-CM

## 2023-10-18 DIAGNOSIS — I10 ESSENTIAL HYPERTENSION, BENIGN: ICD-10-CM

## 2023-10-18 DIAGNOSIS — F41.8 SITUATIONAL ANXIETY: ICD-10-CM

## 2023-10-18 DIAGNOSIS — M25.562 ACUTE PAIN OF LEFT KNEE: ICD-10-CM

## 2023-10-18 DIAGNOSIS — Z11.59 NEED FOR HEPATITIS C SCREENING TEST: ICD-10-CM

## 2023-10-18 DIAGNOSIS — Z12.5 SCREENING FOR PROSTATE CANCER: ICD-10-CM

## 2023-10-18 LAB
ANION GAP SERPL CALCULATED.3IONS-SCNC: 10 MMOL/L (ref 7–15)
BUN SERPL-MCNC: 20.8 MG/DL (ref 8–23)
CALCIUM SERPL-MCNC: 10.5 MG/DL (ref 8.8–10.2)
CHLORIDE SERPL-SCNC: 100 MMOL/L (ref 98–107)
CHOLEST SERPL-MCNC: 100 MG/DL
CREAT SERPL-MCNC: 0.88 MG/DL (ref 0.67–1.17)
DEPRECATED HCO3 PLAS-SCNC: 30 MMOL/L (ref 22–29)
EGFRCR SERPLBLD CKD-EPI 2021: >90 ML/MIN/1.73M2
GLUCOSE SERPL-MCNC: 109 MG/DL (ref 70–99)
HDLC SERPL-MCNC: 30 MG/DL
LDLC SERPL CALC-MCNC: 34 MG/DL
NONHDLC SERPL-MCNC: 70 MG/DL
POTASSIUM SERPL-SCNC: 3.7 MMOL/L (ref 3.4–5.3)
PSA SERPL DL<=0.01 NG/ML-MCNC: 5.81 NG/ML (ref 0–6.5)
SODIUM SERPL-SCNC: 140 MMOL/L (ref 135–145)
TRIGL SERPL-MCNC: 180 MG/DL

## 2023-10-18 PROCEDURE — 36415 COLL VENOUS BLD VENIPUNCTURE: CPT | Mod: ZL | Performed by: FAMILY MEDICINE

## 2023-10-18 PROCEDURE — 80061 LIPID PANEL: CPT | Mod: ZL | Performed by: FAMILY MEDICINE

## 2023-10-18 PROCEDURE — G0103 PSA SCREENING: HCPCS | Mod: ZL | Performed by: FAMILY MEDICINE

## 2023-10-18 PROCEDURE — G0439 PPPS, SUBSEQ VISIT: HCPCS | Performed by: FAMILY MEDICINE

## 2023-10-18 PROCEDURE — 80048 BASIC METABOLIC PNL TOTAL CA: CPT | Mod: ZL | Performed by: FAMILY MEDICINE

## 2023-10-18 PROCEDURE — 86803 HEPATITIS C AB TEST: CPT | Mod: ZL | Performed by: FAMILY MEDICINE

## 2023-10-18 PROCEDURE — 99214 OFFICE O/P EST MOD 30 MIN: CPT | Mod: 25 | Performed by: FAMILY MEDICINE

## 2023-10-18 PROCEDURE — G0463 HOSPITAL OUTPT CLINIC VISIT: HCPCS

## 2023-10-18 RX ORDER — RESPIRATORY SYNCYTIAL VIRUS VACCINE 120MCG/0.5
0.5 KIT INTRAMUSCULAR ONCE
Qty: 1 EACH | Refills: 0 | Status: SHIPPED | OUTPATIENT
Start: 2023-10-18 | End: 2023-10-18

## 2023-10-18 RX ORDER — NAPROXEN 500 MG/1
500 TABLET ORAL 2 TIMES DAILY WITH MEALS
Qty: 60 TABLET | Refills: 4 | Status: SHIPPED | OUTPATIENT
Start: 2023-10-18

## 2023-10-18 RX ORDER — LISINOPRIL 10 MG/1
10 TABLET ORAL DAILY
Qty: 90 TABLET | Refills: 4 | Status: SHIPPED | OUTPATIENT
Start: 2023-10-18

## 2023-10-18 RX ORDER — ATORVASTATIN CALCIUM 40 MG/1
40 TABLET, FILM COATED ORAL DAILY
Qty: 90 TABLET | Refills: 4 | Status: SHIPPED | OUTPATIENT
Start: 2023-10-18

## 2023-10-18 RX ORDER — HYDROXYZINE HYDROCHLORIDE 25 MG/1
25 TABLET, FILM COATED ORAL 3 TIMES DAILY PRN
Qty: 30 TABLET | Refills: 3 | Status: SHIPPED | OUTPATIENT
Start: 2023-10-18

## 2023-10-18 RX ORDER — ATENOLOL 100 MG/1
100 TABLET ORAL DAILY
Qty: 90 TABLET | Refills: 4 | Status: SHIPPED | OUTPATIENT
Start: 2023-10-18

## 2023-10-18 RX ORDER — HYDROCHLOROTHIAZIDE 25 MG/1
25 TABLET ORAL DAILY
Qty: 90 TABLET | Refills: 4 | Status: SHIPPED | OUTPATIENT
Start: 2023-10-18

## 2023-10-18 ASSESSMENT — ANXIETY QUESTIONNAIRES
GAD7 TOTAL SCORE: 1
6. BECOMING EASILY ANNOYED OR IRRITABLE: NOT AT ALL
5. BEING SO RESTLESS THAT IT IS HARD TO SIT STILL: NOT AT ALL
2. NOT BEING ABLE TO STOP OR CONTROL WORRYING: NOT AT ALL
3. WORRYING TOO MUCH ABOUT DIFFERENT THINGS: NOT AT ALL
7. FEELING AFRAID AS IF SOMETHING AWFUL MIGHT HAPPEN: SEVERAL DAYS
IF YOU CHECKED OFF ANY PROBLEMS ON THIS QUESTIONNAIRE, HOW DIFFICULT HAVE THESE PROBLEMS MADE IT FOR YOU TO DO YOUR WORK, TAKE CARE OF THINGS AT HOME, OR GET ALONG WITH OTHER PEOPLE: NOT DIFFICULT AT ALL
GAD7 TOTAL SCORE: 1
1. FEELING NERVOUS, ANXIOUS, OR ON EDGE: NOT AT ALL

## 2023-10-18 ASSESSMENT — ENCOUNTER SYMPTOMS
HEMATOCHEZIA: 0
JOINT SWELLING: 0
SORE THROAT: 0
MYALGIAS: 0
HEMATURIA: 0
ARTHRALGIAS: 1
HEADACHES: 0
DIARRHEA: 0
WEAKNESS: 0
CHILLS: 0
NAUSEA: 0
NERVOUS/ANXIOUS: 0
PARESTHESIAS: 0
SHORTNESS OF BREATH: 0
COUGH: 0
ABDOMINAL PAIN: 0
FEVER: 0
FREQUENCY: 0
DIZZINESS: 0
DYSURIA: 0
EYE PAIN: 0
PALPITATIONS: 0
HEARTBURN: 0
CONSTIPATION: 0

## 2023-10-18 ASSESSMENT — PATIENT HEALTH QUESTIONNAIRE - PHQ9: 5. POOR APPETITE OR OVEREATING: NOT AT ALL

## 2023-10-18 ASSESSMENT — PAIN SCALES - GENERAL: PAINLEVEL: NO PAIN (0)

## 2023-10-18 ASSESSMENT — ACTIVITIES OF DAILY LIVING (ADL): CURRENT_FUNCTION: NO ASSISTANCE NEEDED

## 2023-10-18 NOTE — PROGRESS NOTES
"SUBJECTIVE:   Marbin is a 73 year old who presents for Preventive Visit.      10/18/2023     8:04 AM   Additional Questions   Roomed by JUSTUS Wells   Accompanied by Self         10/18/2023     8:04 AM   Patient Reported Additional Medications   Patient reports taking the following new medications N/A       Are you in the first 12 months of your Medicare coverage?  No    Healthy Habits:     In general, how would you rate your overall health?  Excellent    Frequency of exercise:  6-7 days/week    Duration of exercise:  45-60 minutes    Do you usually eat at least 4 servings of fruit and vegetables a day, include whole grains    & fiber and avoid regularly eating high fat or \"junk\" foods?  No    Taking medications regularly:  Yes    Medication side effects:  None    Ability to successfully perform activities of daily living:  No assistance needed    Home Safety:  No safety concerns identified    Hearing Impairment:  Difficulty following a conversation in a noisy restaurant or crowded room    In the past 6 months, have you been bothered by leaking of urine?  No    In general, how would you rate your overall mental or emotional health?  Excellent    Additional concerns today:  No      Today's PHQ-2 Score:       10/18/2023     8:02 AM   PHQ-2 ( 1999 [employer])   Q1: Little interest or pleasure in doing things 0   Q2: Feeling down, depressed or hopeless 0   PHQ-2 Score 0   Q1: Little interest or pleasure in doing things Not at all   Q2: Feeling down, depressed or hopeless Not at all   PHQ-2 Score 0           Have you ever done Advance Care Planning? (For example, a Health Directive, POLST, or a discussion with a medical provider or your loved ones about your wishes): Yes, patient states has an Advance Care Planning document and will bring a copy to the clinic.       Fall risk  Fallen 2 or more times in the past year?: No  Any fall with injury in the past year?: No    Cognitive Screening   1) Repeat 3 items (Leader, Season, " Table)    2) Clock draw: NORMAL  3) 3 item recall: Recalls 3 objects  Results: 3 items recalled: COGNITIVE IMPAIRMENT LESS LIKELY    Mini-CogTM Copyright ROXANN Villafuerte. Licensed by the author for use in [facility]; reprinted with permission (nayeli@[email or web address]). All rights reserved.      Do you have sleep apnea, excessive snoring or daytime drowsiness? : no    Reviewed and updated as needed this visit by clinical staff   Tobacco  Allergies  Meds   Med Hx  Surg Hx  Fam Hx  Soc Hx        Reviewed and updated as needed this visit by Provider                 Social History     Tobacco Use    Smoking status: Never    Smokeless tobacco: Never   Substance Use Topics    Alcohol use: Yes     Alcohol/week: 0.8 standard drinks of alcohol     Comment: Alcoholic Drinks/day: very occasionally             10/15/2023     8:51 PM   Alcohol Use   Prescreen: >3 drinks/day or >7 drinks/week? No     Do you have a current opioid prescription? No  Do you use any other controlled substances or medications that are not prescribed by a provider? None              Current providers sharing in care for this patient include:    Patient Care Team:  Darin Pappas MD as PCP - General (Family Practice)  Rupesh Dozier MD as Assigned Surgical Provider  Darin Pappas MD as Assigned PCP    The following health maintenance items are reviewed in Epic and correct as of today:  Health Maintenance   Topic Date Due    HEPATITIS C SCREENING  Never done    DTAP/TDAP/TD IMMUNIZATION (1 - Tdap) 09/17/2009    RSV VACCINE 60+ (1 - 1-dose 60+ series) Never done    AORTIC ANEURYSM SCREENING (SYSTEM ASSIGNED)  Never done    MEDICARE ANNUAL WELLNESS VISIT  06/17/2023    COVID-19 Vaccine (7 - 2023-24 season) 09/01/2023    FALL RISK ASSESSMENT  10/18/2024    LIPID  06/17/2027    ADVANCE CARE PLANNING  06/17/2027    COLORECTAL CANCER SCREENING  01/30/2028    PHQ-2 (once per calendar year)  Completed    INFLUENZA VACCINE  Completed    Pneumococcal Vaccine: 65+  Years  Completed    ZOSTER IMMUNIZATION  Completed    IPV IMMUNIZATION  Aged Out    HPV IMMUNIZATION  Aged Out    MENINGITIS IMMUNIZATION  Aged Out     [facility]  Current Outpatient Medications   Medication Sig Dispense Refill    atenolol (TENORMIN) 100 MG tablet Take 1 tablet (100 mg) by mouth daily 90 tablet 4    atorvastatin (LIPITOR) 40 MG tablet Take 1 tablet (40 mg) by mouth daily 90 tablet 4    hydrochlorothiazide (HYDRODIURIL) 25 MG tablet Take 1 tablet (25 mg) by mouth daily 90 tablet 4    hydrOXYzine (ATARAX) 25 MG tablet Take 1 tablet (25 mg) by mouth 3 times daily as needed for anxiety 30 tablet 3    lisinopril (ZESTRIL) 10 MG tablet Take 1 tablet (10 mg) by mouth daily 90 tablet 4    Multiple Vitamins-Minerals (THERA-M) TABS Take 1 tablet by mouth daily      naproxen (NAPROSYN) 500 MG tablet Take 1 tablet (500 mg) by mouth 2 times daily (with meals) 60 tablet 4    respiratory syncytial virus vaccine, bivalent (ABRYSVO) injection Inject 0.5 mLs into the muscle once for 1 dose 1 each 0    Tdap, tetanus-diptheria-acell pertussis, (BOOSTRIX) 5-2.5-18.5 LF-MCG/0.5 LIZ injection Inject 0.5 mLs into the muscle once for 1 dose 0.5 mL 0     Allergies   Allergen Reactions    Sulfa Antibiotics Nausea and Vomiting             Review of Systems   Constitutional:  Negative for chills and fever.   HENT:  Negative for congestion, ear pain, hearing loss and sore throat.    Eyes:  Negative for pain and visual disturbance.   Respiratory:  Negative for cough and shortness of breath.    Cardiovascular:  Negative for chest pain, palpitations and peripheral edema.   Gastrointestinal:  Negative for abdominal pain, constipation, diarrhea, heartburn, hematochezia and nausea.   Genitourinary:  Negative for dysuria, frequency, genital sores, hematuria, impotence, penile discharge and urgency.   Musculoskeletal:  Positive for arthralgias. Negative for joint swelling and myalgias.   Skin:  Negative for rash.  "  Neurological:  Negative for dizziness, weakness, headaches and paresthesias.   Psychiatric/Behavioral:  Negative for mood changes. The patient is not nervous/anxious.      Lots of grief, friend . Finds more anxiety as a result, has used atarax in the past and wants a refill. Happens every all mostly. Not in the winter. Has never used selective serotonin reuptake inhibitor meds and really does not want them.    Using the naproxen rarely for knee pain, usually 2-3 doses will resolve.         OBJECTIVE:   /82   Pulse 62   Temp 97.4  F (36.3  C) (Tympanic)   Resp 14   Ht 1.784 m (5' 10.25\")   Wt 102.3 kg (225 lb 9.6 oz)   SpO2 98%   BMI 32.14 kg/m   Estimated body mass index is 32.14 kg/m  as calculated from the following:    Height as of this encounter: 1.784 m (5' 10.25\").    Weight as of this encounter: 102.3 kg (225 lb 9.6 oz).  Physical Exam  GENERAL: healthy, alert and no distress  EYES: Eyes grossly normal to inspection, PERRL and conjunctivae and sclerae normal  HENT: ear canals and TM's normal, nose and mouth without ulcers or lesions  NECK: no adenopathy, no asymmetry, masses, or scars and thyroid normal to palpation  RESP: lungs clear to auscultation - no rales, rhonchi or wheezes  CV: regular rate and rhythm, normal S1 S2, no S3 or S4, no murmur, click or rub, no peripheral edema and peripheral pulses strong  ABDOMEN: soft, nontender, no hepatosplenomegaly, no masses and bowel sounds normal  MS: no gross musculoskeletal defects noted, no edema  SKIN: no suspicious lesions or rashes  NEURO: Normal strength and tone, mentation intact and speech normal  PSYCH: mentation appears normal, affect normal/bright      Diagnostic Test Results:  Labs reviewed in Epic  Results for orders placed or performed in visit on 10/18/23   Hepatitis C Screen Reflex to HCV RNA Quant and Genotype     Status: Normal   Result Value Ref Range    Hepatitis C Antibody Nonreactive Nonreactive    Narrative    Assay " performance characteristics have not been established for newborns, infants, and children.   Basic Metabolic Panel     Status: Abnormal   Result Value Ref Range    Sodium 140 135 - 145 mmol/L    Potassium 3.7 3.4 - 5.3 mmol/L    Chloride 100 98 - 107 mmol/L    Carbon Dioxide (CO2) 30 (H) 22 - 29 mmol/L    Anion Gap 10 7 - 15 mmol/L    Urea Nitrogen 20.8 8.0 - 23.0 mg/dL    Creatinine 0.88 0.67 - 1.17 mg/dL    GFR Estimate >90 >60 mL/min/1.73m2    Calcium 10.5 (H) 8.8 - 10.2 mg/dL    Glucose 109 (H) 70 - 99 mg/dL   PSA Screen GH     Status: Normal   Result Value Ref Range    Prostate Specific Antigen Screen 5.81 0.00 - 6.50 ng/mL    Narrative    This result is obtained using the Roche Elecsys total PSA method on the yadira e601 immunoassay analyzer. Results obtained with different assay methods or kits cannot be used interchangeably.   Lipid Panel     Status: Abnormal   Result Value Ref Range    Cholesterol 100 <200 mg/dL    Triglycerides 180 (H) <150 mg/dL    Direct Measure HDL 30 (L) >=40 mg/dL    LDL Cholesterol Calculated 34 <=100 mg/dL    Non HDL Cholesterol 70 <130 mg/dL    Narrative    Cholesterol  Desirable:  <200 mg/dL    Triglycerides  Normal:  Less than 150 mg/dL  Borderline High:  150-199 mg/dL  High:  200-499 mg/dL  Very High:  Greater than or equal to 500 mg/dL    Direct Measure HDL  Female:  Greater than or equal to 50 mg/dL   Male:  Greater than or equal to 40 mg/dL    LDL Cholesterol  Desirable:  <100mg/dL  Above Desirable:  100-129 mg/dL   Borderline High:  130-159 mg/dL   High:  160-189 mg/dL   Very High:  >= 190 mg/dL    Non HDL Cholesterol  Desirable:  130 mg/dL  Above Desirable:  130-159 mg/dL  Borderline High:  160-189 mg/dL  High:  190-219 mg/dL  Very High:  Greater than or equal to 220 mg/dL         ASSESSMENT / PLAN:   (Z00.00) Encounter for Medicare annual wellness exam  (primary encounter diagnosis)  Comment: doing well  Plan:      (Z23) Need for Tdap vaccination  Comment: at  "pharmacy  Plan: Tdap, tetanus-diptheria-acell pertussis,         (BOOSTRIX) 5-2.5-18.5 LF-MCG/0.5 LIZ injection             (Z29.11) Need for vaccination against respiratory syncytial virus  Comment:    Plan: respiratory syncytial virus vaccine, bivalent         (ABRYSVO) injection             (Z11.59) Need for hepatitis C screening test  Comment:    Plan: Hepatitis C Screen Reflex to HCV RNA Quant and         Genotype             (I10) Essential hypertension, benign  Comment: is at goal, refilled all without changes  Plan: atenolol (TENORMIN) 100 MG tablet,         hydrochlorothiazide (HYDRODIURIL) 25 MG tablet,        lisinopril (ZESTRIL) 10 MG tablet, Basic         Metabolic Panel             (E78.5) Dyslipidemia  Comment: also at goal, refilled med without changes  Plan: atorvastatin (LIPITOR) 40 MG tablet, Lipid         Panel             (M25.562) Acute pain of left knee  Comment: likely djd, refilled this as well for a year  Plan: naproxen (NAPROSYN) 500 MG tablet             (Z12.5) Screening for prostate cancer  Comment:    Plan: PSA Screen GH             (F41.8) Situational anxiety  Comment: [place] Discussed with him some of the increased risks in people over 65. Offered SSRIs or ativan. After we talked about risks with all of these, he wanted to proceed with the atarax. I also offered 10 milligram, to take initially, with a repeat dose in an hour as needed, again he wanted to use what he has used in the past.   Plan: hydrOXYzine (ATARAX) 25 MG tablet                   COUNSELING:  Reviewed preventive health counseling, as reflected in patient instructions       Regular exercise       Healthy diet/nutrition      BMI:   Estimated body mass index is 32.14 kg/m  as calculated from the following:    Height as of this encounter: 1.784 m (5' 10.25\").    Weight as of this encounter: 102.3 kg (225 lb 9.6 oz).   Weight management plan: Discussed healthy diet and exercise " guidelines      He reports that he has never smoked. He has never used smokeless tobacco.      Appropriate preventive services were discussed with this patient, including applicable screening as appropriate for fall prevention, nutrition, physical activity, Tobacco-use cessation, weight loss and cognition.  Checklist reviewing preventive services available has been given to the patient.    Reviewed patients plan of care and provided an AVS. The Basic Care Plan (routine screening as documented in Health Maintenance) for Marbin meets the Care Plan requirement. This Care Plan has been established and reviewed with the Patient.          Darin Pappas MD  [facility]    Identified Health Risks:  I have reviewed Opioid Use Disorder and Substance Use Disorder risk factors and made any needed referrals.

## 2023-10-18 NOTE — NURSING NOTE
"Chief Complaint   Patient presents with    Medicare Visit       Initial /82   Pulse 62   Temp 97.4  F (36.3  C) (Tympanic)   Resp 14   Ht 1.784 m (5' 10.25\")   Wt 102.3 kg (225 lb 9.6 oz)   SpO2 98%   BMI 32.14 kg/m   Estimated body mass index is 32.14 kg/m  as calculated from the following:    Height as of this encounter: 1.784 m (5' 10.25\").    Weight as of this encounter: 102.3 kg (225 lb 9.6 oz).  Medication Reconciliation: complete        "

## 2023-10-18 NOTE — PATIENT INSTRUCTIONS
Patient Education   Personalized Prevention Plan  You are due for the preventive services outlined below.  Your care team is available to assist you in scheduling these services.  If you have already completed any of these items, please share that information with your care team to update in your medical record.  Health Maintenance Due   Topic Date Due     Hepatitis C Screening  Never done     Diptheria Tetanus Pertussis (DTAP/TDAP/TD) Vaccine (1 - Tdap) 09/17/2009     RSV VACCINE 60+ (1 - 1-dose 60+ series) Never done     AORTIC ANEURYSM SCREENING (SYSTEM ASSIGNED)  Never done     COVID-19 Vaccine (7 - 2023-24 season) 09/01/2023     Learning About Dietary Guidelines  What are the Dietary Guidelines for Americans?     Dietary Guidelines for Americans provide tips for eating well and staying healthy. This helps reduce the risk for long-term (chronic) diseases.  These guidelines recommend that you:  Eat and drink the right amount for you. The U.[place] government's food guide is called MyPlate. It can help you make your own well-balanced eating plan.  Try to balance your eating with your activity. This helps you stay at a healthy weight.  Drink alcohol in moderation, if at all.  Limit foods high in salt, saturated fat, trans fat, and added sugar.  These guidelines are from the U.S. Department of Agriculture and the U.S. Department of Health and Human Services. They are updated every 5 years.  What is MyPlate?  MyPlate is the U.[place]. government's food guide. It can help you make your own well-balanced eating plan. A balanced eating plan means that you eat enough, but not too much, and that your food gives you the nutrients you need to stay healthy.  MyPlate focuses on eating plenty of whole grains, fruits, and vegetables, and on limiting fat and sugar. It is available online at www.ChooseMyPlate.gov.  How can you get started?  If you're trying to eat healthier, you can slowly change your eating habits over time. You don't have  "to make big changes all at once. Start by adding one or two healthy foods to your meals each day.  Grains  Choose whole-grain breads and cereals and whole-wheat pasta and whole-grain crackers.  Vegetables  Eat a variety of vegetables every day. They have lots of nutrients and are part of a heart-healthy diet.  Fruits  Eat a variety of fruits every day. Fruits contain lots of nutrients. Choose fresh fruit instead of fruit juice.  Protein foods  Choose fish and lean poultry more often. Eat red meat and fried meats less often. Dried beans, tofu, and nuts are also good sources of protein.  Dairy  Choose low-fat or fat-free products from this food group. If you have problems digesting milk, try eating cheese or yogurt instead.  Fats and oils  Limit fats and oils if you're trying to cut calories. Choose healthy fats when you cook. These include canola oil and olive oil.  Where can you learn more?  Go to https://www.[employer].net/patiented  Enter D676 in the search box to learn more about \"Learning About Dietary Guidelines.\"  Current as of: March 1, 2023               Content Version: 13.7    7575-6466 [employer].   Care instructions adapted under license by your healthcare professional. If you have questions about a medical condition or this instruction, always ask your healthcare professional. [employer] disclaims any warranty or liability for your use of this information.      Hearing Loss: Care Instructions  Overview     Hearing loss is a sudden or slow decrease in how well you hear. It can range from slight to profound. Permanent hearing loss can occur with aging. It also can happen when you are exposed long-term to loud noise. Examples include listening to loud music, riding motorcycles, or being around other loud machines.  Hearing loss can affect your work and home life. It can make you feel lonely or depressed. You may feel that you have lost your independence. But hearing aids and " other devices can help you hear better and feel connected to others.  Follow-up care is a key part of your treatment and safety. Be sure to make and go to all appointments, and call your doctor if you are having problems. It's also a good idea to know your test results and keep a list of the medicines you take.  How can you care for yourself at home?  Avoid loud noises whenever possible. This helps keep your hearing from getting worse.  Always wear hearing protection around loud noises.  Wear a hearing aid as directed.  A professional can help you pick a hearing aid that will work best for you.  You can also get hearing aids over the counter for mild to moderate hearing loss.  Have hearing tests as your doctor suggests. They can show whether your hearing has changed. Your hearing aid may need to be adjusted.  Use other devices as needed. These may include:  Telephone amplifiers and hearing aids that can connect to a television, stereo, radio, or microphone.  Devices that use lights or vibrations. These alert you to the doorbell, a ringing telephone, or a baby monitor.  Television closed-captioning. This shows the words at the bottom of the screen. Most new TVs can do this.  TTY (text telephone). This lets you type messages back and forth on the telephone instead of talking or listening. These devices are also called TDD. When messages are typed on the keyboard, they are sent over the phone line to a receiving TTY. The message is shown on a monitor.  Use text messaging, social media, and email if it is hard for you to communicate by telephone.  Try to learn a listening technique called speechreading. It is not lipreading. You pay attention to people's gestures, expressions, posture, and tone of voice. These clues can help you understand what a person is saying. Face the person you are talking to, and have them face you. Make sure the lighting is good. You need to see the other person's face clearly.  Think about  "counseling if you need help to adjust to your hearing loss.  When should you call for help?  Watch closely for changes in your health, and be sure to contact your doctor if:    You think your hearing is getting worse.     You have new symptoms, such as dizziness or nausea.   Where can you learn more?  Go to https://www.[employer].net/patiented  Enter R798 in the search box to learn more about \"Hearing Loss: Care Instructions.\"  Current as of: March 1, 2023               Content Version: 13.7    1483-9013 [employer].   Care instructions adapted under license by your healthcare professional. If you have questions about a medical condition or this instruction, always ask your healthcare professional. [employer] disclaims any warranty or liability for your use of this information.         "

## 2023-10-19 LAB — HCV AB SERPL QL IA: NONREACTIVE

## 2023-11-14 ENCOUNTER — PATIENT OUTREACH (OUTPATIENT)
Dept: GASTROENTEROLOGY | Facility: CLINIC | Age: 73
End: 2023-11-14
Payer: COMMERCIAL

## 2024-03-22 ENCOUNTER — TELEPHONE (OUTPATIENT)
Dept: FAMILY MEDICINE | Facility: OTHER | Age: 74
End: 2024-03-22
Payer: COMMERCIAL

## 2024-03-22 ENCOUNTER — VIRTUAL VISIT (OUTPATIENT)
Dept: FAMILY MEDICINE | Facility: OTHER | Age: 74
End: 2024-03-22
Attending: FAMILY MEDICINE
Payer: COMMERCIAL

## 2024-03-22 DIAGNOSIS — U07.1 INFECTION DUE TO 2019 NOVEL CORONAVIRUS: Primary | ICD-10-CM

## 2024-03-22 PROCEDURE — 99441 PR PHYSICIAN TELEPHONE EVALUATION 5-10 MIN: CPT | Mod: 93

## 2024-03-22 NOTE — TELEPHONE ENCOUNTER
S-(situation): COVID positive as of this morning.     B-(background): symptoms since last night. Is vaccinated. Has never had COVID.     A-(assessment): chilled, achy, coughing. Mild temp.     R-(recommendations): patient wants Paxlovid. Writer transferred him to scheduling.     BARBI LAMAS RN on 3/22/2024 at 12:32 PM

## 2024-03-22 NOTE — PROGRESS NOTES
Marbin is a 73 year old who is being evaluated via a billable telephone visit.      Originating Location (pt. Location): Home    Distant Location (provider location):  On-site    Assessment & Plan   Problem List Items Addressed This Visit    None  Visit Diagnoses       Infection due to 2019 novel coronavirus    -  Primary    Relevant Medications    nirmatrelvir and ritonavir (PAXLOVID) 300 mg/100 mg therapy pack        Patient presents for telephone visit due to diagnosis with COVID-19.  Symptoms started on 3/21/2024, home test was positive on 3/22/2024.  He has a dry cough and has felt feverish, he endorses sore throat and bodyaches.  No difficulty breathing or chest pain.  Exam limited due to telephone visit.  I did review risks and benefits of Paxlovid treatment.  Denies being on blood thinners.  He is on Lipitor.  Recent GFR above 90.  Due to risk factors including age and hypertension we will opt to start treatment at this time with Paxlovid therapy.  I did advise that this will interact with his atorvastatin and recommend holding for the next 10 days.  Return to care parameters were reviewed and they verbalized understanding.      Return if symptoms worsen or fail to improve.      Subjective   Marbin is a 73 year old, presenting for the following health issues:  Covid Concern (Positive Covid Test Today 3/22/24/Symptom Onset 3/21/24)    HPI       COVID-19 Symptom Review  How many days ago did these symptoms start? 3-21-24.  Positive home COVID test today.    Are any of the following symptoms significant for you?  New or worsening difficulty breathing? No  Worsening cough? Yes, it's a dry cough.   Fever or chills? Yes, I felt feverish or had chills.  Headache: No  Sore throat: YES  Chest pain: No  Diarrhea: No  Body aches? YES    What treatments has patient tried? Ibuprofen, Tylenol  Does patient live in a nursing home, group home, or shelter? No  Does patient have a way to get food/medications during quarantined?  Yes, I have a friend or family member who can help me.                    Objective           Vitals:  No vitals were obtained today due to virtual visit.    Physical Exam   General: Alert and no distress //Respiratory: No audible wheeze, cough, or shortness of breath // Psychiatric:  Appropriate affect, tone, and pace of words            Phone call duration: 10 minutes  Signed Electronically by: ÁNGEL SHARPE NURSE

## 2024-03-22 NOTE — PATIENT INSTRUCTIONS
For informational purposes only. Not to replace the advice of your health care provider. Copyright   2022 [facility]. All rights reserved. Clinically reviewed by Chayito Milner, PharmD, BCACP. [employer] [id number] - REV 06/23.  COVID-19 Outpatient Treatments  Your care team can help you find the best treatments for COVID-19. Talk to a health care provider or refer to the FDA medicine fact sheets below.  Paxlovid (nirmatrelvir and ritonavir): https://www.paxlovid.Chinacars/resources  Molnupiravir (Lagevrio): https://www.fda.gov/media/634329/download  Important: We can only prescribe Paxlovid or Molnupiravir when it can be started within 5 days of first having symptoms.  Paxlovid (nimatrelvir and ritonavir)  How it works  Two medicines (nirmatrelvir and ritonavir) are taken together. They stop the virus from growing. Less amount of virus is easier for your body to fight.  Benefits  Lowers risk of a hospital stay or death from COVID-19.  How to take  Medicine comes in a daily container with both medicine tablets. Take by mouth twice daily (once in the morning, once at night) for 5 days.  The number of tablets to take varies by patient.  Don't chew or break capsules. Swallow whole.  When to take  Take it as soon as possible and within 5 days of your first symptoms.  Who can take it  Patients must be 12 years or older weigh at least 88 pounds. Paxlovid is the preferred treatment for pregnant patients.  Possible side effects  Can cause altered sense of taste, diarrhea (loose, watery stools), high blood pressure, muscle aches.  Medicine conflicts  Some medicines may conflict with Paxlovid and may cause serious side effects.  Tell your care team about all the medicines you take, including prescription and over-the-counter medicines, vitamins, and herbal supplements.  Your care team will review your medicines to make sure that you can safely take Paxlovid.  Cautions  Paxlovid is not advised for patients with severe  kidney or liver disease. If you have kidney or liver problems, the dose may need to be changed.  If you're pregnant or breastfeeding, talk to your care team about your options.  If you take hormonal birth control (such as the Pill), then you or your partner should also use a non-hormonal form of birth control (such as a condom). Keep doing this for 1 menstrual cycle after your last dose of Paxlovid.  Molnupiravir (lagevrio)  How it works  Stops the virus from growing. Less amount of virus is easier for your body to fight.  Benefits  Lowers risk of a hospital stay or death from COVID-19.  How to take  Take 4 capsules by mouth every 12 hours (4 in the morning and 4 at night) for 5 days. Don't chew or break capsules. Swallow whole.  When to take  Take as soon as possible and within 5 days of your first symptoms.  Who can take it  Patients must be 18 years or older.   Possible side effects  Diarrhea (loose, watery stools), nausea (feeling sick to your stomach), dizziness, headaches.  Medicine conflicts  Right now, there are no known conflicts with other drugs. But tell your care team about all medicines you take.  Cautions  This medicine is not advised for patients who are pregnant.  If you are someone who could become pregnant, use trusted birth control until 4 days after your last dose of molnupiravir.  If your partner could become pregnant, you should use trusted birth control until 3 months after your last dose of molnupiravir.

## 2024-03-22 NOTE — NURSING NOTE
"Chief Complaint   Patient presents with    Covid Concern     Positive Covid Test Today 3/22/24  Symptom Onset 3/21/24       Initial There were no vitals taken for this visit. Estimated body mass index is 32.14 kg/m  as calculated from the following:    Height as of 10/18/23: 1.784 m (5' 10.25\").    Weight as of 10/18/23: 102.3 kg (225 lb 9.6 oz).    FOOD SECURITY SCREENING QUESTIONS:    The next two questions are to help us understand your food security.  If you are feeling you need any assistance in this area, we have resources available to support you today.    Hunger Vital Signs:  Within the past 12 months we worried whether our food would run out before we got money to buy more. Never  Within the past 12 months the food we bought just didn't last and we didn't have money to get more. Never    Medication Reconciliation: Complete.     *Verbal permission given by patient to speak with wife Sola today*      Yolande Casanova LPN on 3/22/2024 at 12:51 PM     "

## 2024-10-20 SDOH — HEALTH STABILITY: PHYSICAL HEALTH: ON AVERAGE, HOW MANY MINUTES DO YOU ENGAGE IN EXERCISE AT THIS LEVEL?: 60 MIN

## 2024-10-20 SDOH — HEALTH STABILITY: PHYSICAL HEALTH: ON AVERAGE, HOW MANY DAYS PER WEEK DO YOU ENGAGE IN MODERATE TO STRENUOUS EXERCISE (LIKE A BRISK WALK)?: 6 DAYS

## 2024-10-20 ASSESSMENT — SOCIAL DETERMINANTS OF HEALTH (SDOH): HOW OFTEN DO YOU GET TOGETHER WITH FRIENDS OR RELATIVES?: ONCE A WEEK

## 2024-10-21 ENCOUNTER — OFFICE VISIT (OUTPATIENT)
Dept: FAMILY MEDICINE | Facility: OTHER | Age: 74
End: 2024-10-21
Attending: FAMILY MEDICINE
Payer: COMMERCIAL

## 2024-10-21 VITALS
TEMPERATURE: 96.9 F | WEIGHT: 224.2 LBS | DIASTOLIC BLOOD PRESSURE: 76 MMHG | RESPIRATION RATE: 16 BRPM | HEIGHT: 71 IN | BODY MASS INDEX: 31.39 KG/M2 | SYSTOLIC BLOOD PRESSURE: 124 MMHG | OXYGEN SATURATION: 99 % | HEART RATE: 53 BPM

## 2024-10-21 DIAGNOSIS — L84 CORN OR CALLUS: ICD-10-CM

## 2024-10-21 DIAGNOSIS — Z00.00 ENCOUNTER FOR MEDICARE ANNUAL WELLNESS EXAM: Primary | ICD-10-CM

## 2024-10-21 DIAGNOSIS — I10 ESSENTIAL HYPERTENSION, BENIGN: ICD-10-CM

## 2024-10-21 DIAGNOSIS — Z12.5 SCREENING FOR PROSTATE CANCER: ICD-10-CM

## 2024-10-21 DIAGNOSIS — E78.5 DYSLIPIDEMIA: ICD-10-CM

## 2024-10-21 DIAGNOSIS — M25.562 ACUTE PAIN OF LEFT KNEE: ICD-10-CM

## 2024-10-21 LAB
ANION GAP SERPL CALCULATED.3IONS-SCNC: 7 MMOL/L (ref 7–15)
BUN SERPL-MCNC: 19.7 MG/DL (ref 8–23)
CALCIUM SERPL-MCNC: 10.2 MG/DL (ref 8.8–10.4)
CHLORIDE SERPL-SCNC: 100 MMOL/L (ref 98–107)
CHOLEST SERPL-MCNC: 106 MG/DL
CREAT SERPL-MCNC: 0.91 MG/DL (ref 0.67–1.17)
EGFRCR SERPLBLD CKD-EPI 2021: 88 ML/MIN/1.73M2
FASTING STATUS PATIENT QL REPORTED: YES
FASTING STATUS PATIENT QL REPORTED: YES
GLUCOSE SERPL-MCNC: 111 MG/DL (ref 70–99)
HCO3 SERPL-SCNC: 33 MMOL/L (ref 22–29)
HDLC SERPL-MCNC: 32 MG/DL
LDLC SERPL CALC-MCNC: 38 MG/DL
NONHDLC SERPL-MCNC: 74 MG/DL
POTASSIUM SERPL-SCNC: 4.1 MMOL/L (ref 3.4–5.3)
PSA SERPL DL<=0.01 NG/ML-MCNC: 5.2 NG/ML (ref 0–6.5)
SODIUM SERPL-SCNC: 140 MMOL/L (ref 135–145)
TRIGL SERPL-MCNC: 179 MG/DL

## 2024-10-21 PROCEDURE — 36415 COLL VENOUS BLD VENIPUNCTURE: CPT | Mod: ZL | Performed by: FAMILY MEDICINE

## 2024-10-21 PROCEDURE — G0008 ADMIN INFLUENZA VIRUS VAC: HCPCS

## 2024-10-21 PROCEDURE — 80061 LIPID PANEL: CPT | Mod: ZL | Performed by: FAMILY MEDICINE

## 2024-10-21 PROCEDURE — 80048 BASIC METABOLIC PNL TOTAL CA: CPT | Mod: ZL | Performed by: FAMILY MEDICINE

## 2024-10-21 PROCEDURE — G0103 PSA SCREENING: HCPCS | Mod: ZL | Performed by: FAMILY MEDICINE

## 2024-10-21 PROCEDURE — G0463 HOSPITAL OUTPT CLINIC VISIT: HCPCS | Mod: 25

## 2024-10-21 PROCEDURE — 99214 OFFICE O/P EST MOD 30 MIN: CPT | Mod: 25 | Performed by: FAMILY MEDICINE

## 2024-10-21 PROCEDURE — G0439 PPPS, SUBSEQ VISIT: HCPCS | Performed by: FAMILY MEDICINE

## 2024-10-21 PROCEDURE — 90480 ADMN SARSCOV2 VAC 1/ONLY CMP: CPT

## 2024-10-21 RX ORDER — ATENOLOL 100 MG/1
100 TABLET ORAL DAILY
Qty: 90 TABLET | Refills: 4 | Status: SHIPPED | OUTPATIENT
Start: 2024-10-21

## 2024-10-21 RX ORDER — LISINOPRIL 10 MG/1
10 TABLET ORAL DAILY
Qty: 90 TABLET | Refills: 4 | Status: SHIPPED | OUTPATIENT
Start: 2024-10-21

## 2024-10-21 RX ORDER — ATORVASTATIN CALCIUM 40 MG/1
40 TABLET, FILM COATED ORAL DAILY
Qty: 90 TABLET | Refills: 4 | Status: SHIPPED | OUTPATIENT
Start: 2024-10-21

## 2024-10-21 RX ORDER — NAPROXEN 500 MG/1
500 TABLET ORAL 2 TIMES DAILY WITH MEALS
Qty: 60 TABLET | Refills: 4 | Status: SHIPPED | OUTPATIENT
Start: 2024-10-21

## 2024-10-21 RX ORDER — HYDROCHLOROTHIAZIDE 25 MG/1
25 TABLET ORAL DAILY
Qty: 90 TABLET | Refills: 4 | Status: SHIPPED | OUTPATIENT
Start: 2024-10-21

## 2024-10-21 ASSESSMENT — PAIN SCALES - GENERAL: PAINLEVEL: SEVERE PAIN (6)

## 2024-10-21 NOTE — PATIENT INSTRUCTIONS
Patient Education   Preventive Care Advice   This is general advice given by our system to help you stay healthy. However, your care team may have specific advice just for you. Please talk to your care team about your preventive care needs.  Nutrition  Eat 5 or more servings of fruits and vegetables each day.  Try wheat bread, brown rice and whole grain pasta (instead of white bread, rice, and pasta).  Get enough calcium and vitamin D. Check the label on foods and aim for 100% of the RDA (recommended daily allowance).  Lifestyle  Exercise at least 150 minutes each week  (30 minutes a day, 5 days a week).  Do muscle strengthening activities 2 days a week. These help control your weight and prevent disease.  No smoking.  Wear sunscreen to prevent skin cancer.  Have a dental exam and cleaning every 6 months.  Yearly exams  See your health care team every year to talk about:  Any changes in your health.  Any medicines your care team has prescribed.  Preventive care, family planning, and ways to prevent chronic diseases.  Shots (vaccines)   HPV shots (up to age 26), if you've never had them before.  Hepatitis B shots (up to age 59), if you've never had them before.  COVID-19 shot: Get this shot when it's due.  Flu shot: Get a flu shot every year.  Tetanus shot: Get a tetanus shot every 10 years.  Pneumococcal, hepatitis A, and RSV shots: Ask your care team if you need these based on your risk.  Shingles shot (for age 50 and up)  General health tests  Diabetes screening:  Starting at age 35, Get screened for diabetes at least every 3 years.  If you are younger than age 35, ask your care team if you should be screened for diabetes.  Cholesterol test: At age 39, start having a cholesterol test every 5 years, or more often if advised.  Bone density scan (DEXA): At age 50, ask your care team if you should have this scan for osteoporosis (brittle bones).  Hepatitis C: Get tested at least once in your life.  STIs (sexually  transmitted infections)  Before age 24: Ask your care team if you should be screened for STIs.  After age 24: Get screened for STIs if you're at risk. You are at risk for STIs (including HIV) if:  You are sexually active with more than one person.  You don't use condoms every time.  You or a partner was diagnosed with a sexually transmitted infection.  If you are at risk for HIV, ask about PrEP medicine to prevent HIV.  Get tested for HIV at least once in your life, whether you are at risk for HIV or not.  Cancer screening tests  Cervical cancer screening: If you have a cervix, begin getting regular cervical cancer screening tests starting at age 21.  Breast cancer scan (mammogram): If you've ever had breasts, begin having regular mammograms starting at age 40. This is a scan to check for breast cancer.  Colon cancer screening: It is important to start screening for colon cancer at age 45.  Have a colonoscopy test every 10 years (or more often if you're at risk) Or, ask your provider about stool tests like a FIT test every year or Cologuard test every 3 years.  To learn more about your testing options, visit:   .  For help making a decision, visit:   https://bit.ly/dd10654.  Prostate cancer screening test: If you have a prostate, ask your care team if a prostate cancer screening test (PSA) at age 55 is right for you.  Lung cancer screening: If you are a current or former smoker ages 50 to 80, ask your care team if ongoing lung cancer screenings are right for you.  For informational purposes only. Not to replace the advice of your health care provider. Copyright   2023 [facility] [employer]. All rights reserved. Clinically reviewed by the [facility]. [employer] [id number] - REV 01/24.  Hearing Loss: Care Instructions  Overview     Hearing loss is a sudden or slow decrease in how well you hear. It can range from slight to profound. Permanent hearing loss can occur with aging. It also can  happen when you are exposed long-term to loud noise. Examples include listening to loud music, riding motorcycles, or being around other loud machines.  Hearing loss can affect your work and home life. It can make you feel lonely or depressed. You may feel that you have lost your independence. But hearing aids and other devices can help you hear better and feel connected to others.  Follow-up care is a key part of your treatment and safety. Be sure to make and go to all appointments, and call your doctor if you are having problems. It's also a good idea to know your test results and keep a list of the medicines you take.  How can you care for yourself at home?  Avoid loud noises whenever possible. This helps keep your hearing from getting worse.  Always wear hearing protection around loud noises.  Wear a hearing aid as directed.  A professional can help you pick a hearing aid that will work best for you.  You can also get hearing aids over the counter for mild to moderate hearing loss.  Have hearing tests as your doctor suggests. They can show whether your hearing has changed. Your hearing aid may need to be adjusted.  Use other devices as needed. These may include:  Telephone amplifiers and hearing aids that can connect to a television, stereo, radio, or microphone.  Devices that use lights or vibrations. These alert you to the doorbell, a ringing telephone, or a baby monitor.  Television closed-captioning. This shows the words at the bottom of the screen. Most new TVs can do this.  TTY (text telephone). This lets you type messages back and forth on the telephone instead of talking or listening. These devices are also called TDD. When messages are typed on the keyboard, they are sent over the phone line to a receiving TTY. The message is shown on a monitor.  Use text messaging, social media, and email if it is hard for you to communicate by telephone.  Try to learn a listening technique called speechreading. It is  "not lipreading. You pay attention to people's gestures, expressions, posture, and tone of voice. These clues can help you understand what a person is saying. Face the person you are talking to, and have them face you. Make sure the lighting is good. You need to see the other person's face clearly.  Think about counseling if you need help to adjust to your hearing loss.  When should you call for help?  Watch closely for changes in your health, and be sure to contact your doctor if:    You think your hearing is getting worse.     You have new symptoms, such as dizziness or nausea.   Where can you learn more?  Go to https://www.[employer].net/patiented  Enter R798 in the search box to learn more about \"Hearing Loss: Care Instructions.\"  Current as of: September 27, 2023  Content Version: 14.2 2024 [facility] [employer].   Care instructions adapted under license by your healthcare professional. If you have questions about a medical condition or this instruction, always ask your healthcare professional. [employer] disclaims any warranty or liability for your use of this information.       "

## 2024-10-21 NOTE — PROGRESS NOTES
"Preventive Care Visit  [facility]  Darin Pappas MD, Family Medicine  Oct 21, 2024      Assessment & Plan     (Z00.00) Encounter for Medicare annual wellness exam  (primary encounter diagnosis)  Comment: see below  Plan:      (I10) Essential hypertension, benign  Comment: is at goal  Plan: BASIC METABOLIC PANEL, atenolol (TENORMIN) 100         MG tablet, hydrochlorothiazide (HYDRODIURIL) 25        MG tablet, lisinopril (ZESTRIL) 10 MG tablet        Refilled all without changes    (E78.5) Dyslipidemia  Comment: stable  Plan: Lipid Profile, atorvastatin (LIPITOR) 40 MG         tablet        Refilled without changes    (Z12.5) Screening for prostate cancer  Comment: negative screen  Plan: PSA Screen GH             (L84) Corn or callus  Comment: new  Plan: pumice stone and corn pad advised for initial treatment                (M25.562) Acute pain of left knee  Comment: this is now more chronic and consistent with knee djd.   Plan: naproxen (NAPROSYN) 500 MG tablet         Refilled per pt request              BMI  Estimated body mass index is 31.27 kg/m  as calculated from the following:    Height as of this encounter: 1.803 m (5' 11\").    Weight as of this encounter: 101.7 kg (224 lb 3.2 oz).       Counseling  Appropriate preventive services were addressed with this patient via screening, questionnaire, or discussion as appropriate for fall prevention, nutrition, physical activity, Tobacco-use cessation, social engagement, weight loss and cognition.  Checklist reviewing preventive services available has been given to the patient.  Reviewed patient's diet, addressing concerns and/or questions.   He is at risk for psychosocial distress and has been provided with information to reduce risk.   The patient was provided with written information regarding signs of hearing loss.           No follow-ups on file.    Lyle Zazueta is a 74 year old, presenting for the following:  Medicare Visit        " 10/21/2024     8:07 AM   Additional Questions   Roomed by JUSTUS Wells   Accompanied by Self         10/21/2024     8:07 AM   Patient Reported Additional Medications   Patient reports taking the following new medications N/A         Health Care Directive  Patient does not have a Health Care Directive or Living Will: Advance Directive received and scanned. Click on Code in the patient header to view.    HPI  Needs refills on hypertension meds. No side effects. Walking about 10,000 or more steps daily. Getting a pain on left foot sole, feels might be a wart. Pain with certain shoes.     Knee pain perhaps 2 times a month. Will take the naproxen for up to 3 days.             10/20/2024   General Health   How would you rate your overall physical health? Good   Feel stress (tense, anxious, or unable to sleep) Only a little      (!) STRESS CONCERN      10/20/2024   Nutrition   Diet: Regular (no restrictions)            10/20/2024   Exercise   Days per week of moderate/strenous exercise 6 days   Average minutes spent exercising at this level 60 min            10/20/2024   Social Factors   Frequency of gathering with friends or relatives Once a week   Worry food won't last until get money to buy more No   Food not last or not have enough money for food? No   Do you have housing? (Housing is defined as stable permanent housing and does not include staying ouside in a car, in a tent, in an abandoned building, in an overnight shelter, or couch-surfing.) Yes   Are you worried about losing your housing? No   Lack of transportation? No   Unable to get utilities (heat,electricity)? No            10/20/2024   Fall Risk   Fallen 2 or more times in the past year? No    No   Trouble with walking or balance? No    No       Multiple values from one day are sorted in reverse-chronological order          10/20/2024   Activities of Daily Living- Home Safety   Needs help with the following daily activites None of the above   Safety concerns  in the home None of the above            10/20/2024   Dental   Dentist two times every year? Yes            10/20/2024   Hearing Screening   Hearing concerns? (!) IT'S HARD TO FOLLOW A CONVERSATION IN A NOISY RESTAURANT OR CROWDED ROOM.            10/20/2024   Driving Risk Screening   Patient/family members have concerns about driving No            10/20/2024   General Alertness/Fatigue Screening   Have you been more tired than usual lately? No            10/20/2024   Urinary Incontinence Screening   Bothered by leaking urine in past 6 months No            10/20/2024   TB Screening   Were you born outside of the [place]? No            Today's PHQ-2 Score:       10/20/2024     7:19 PM   PHQ-2 ( 1999 [employer]izer)   Q1: Little interest or pleasure in doing things 0   Q2: Feeling down, depressed or hopeless 0   PHQ-2 Score 0   Q1: Little interest or pleasure in doing things Not at all   Q2: Feeling down, depressed or hopeless Not at all   PHQ-2 Score 0           10/20/2024   Substance Use   Alcohol more than 3/day or more than 7/wk No   Do you have a current opioid prescription? No   How severe/bad is pain from 1 to 10? 1/10   Do you use any other substances recreationally? No        Social History     Tobacco Use    Smoking status: Never     Passive exposure: Never    Smokeless tobacco: Never   Vaping Use    Vaping status: Never Used   Substance Use Topics    Alcohol use: Yes     Alcohol/week: 0.8 standard drinks of alcohol     Comment: Alcoholic Drinks/day: very occasionally    Drug use: Never           10/20/2024   AAA Screening   Family history of Abdominal Aortic Aneurysm (AAA)? No      ASCVD Risk   The ASCVD Risk score (Kirill CHAUDHARI, et al., 2019) failed to calculate for the following reasons:    The valid total cholesterol range is 130 to 320 mg/dL            Reviewed and updated as needed this visit by Provider                    Past Medical History:   Diagnosis Date    Essential (primary) hypertension     No  Comments Provided     Past Surgical History:   Procedure Laterality Date    CLOSED REDUCTION ANKLE Right 1962    COLONOSCOPY  2007    Due 2017    COLONOSCOPY  01/15/2018    F/U 2023    COLONOSCOPY N/A 01/30/2023    F/U 2028 serrated adenoma, sigmoid diverticulosis    OTHER SURGICAL HISTORY  07/2017    PROSTATE BIOPSY,Negative    TONSILLECTOMY  1957     Current Outpatient Medications   Medication Sig Dispense Refill    atenolol (TENORMIN) 100 MG tablet Take 1 tablet (100 mg) by mouth daily 90 tablet 4    atorvastatin (LIPITOR) 40 MG tablet Take 1 tablet (40 mg) by mouth daily 90 tablet 4    hydrochlorothiazide (HYDRODIURIL) 25 MG tablet Take 1 tablet (25 mg) by mouth daily 90 tablet 4    hydrOXYzine (ATARAX) 25 MG tablet Take 1 tablet (25 mg) by mouth 3 times daily as needed for anxiety 30 tablet 3    lisinopril (ZESTRIL) 10 MG tablet Take 1 tablet (10 mg) by mouth daily 90 tablet 4    Multiple Vitamins-Minerals (THERA-M) TABS Take 1 tablet by mouth daily      naproxen (NAPROSYN) 500 MG tablet Take 1 tablet (500 mg) by mouth 2 times daily (with meals) 60 tablet 4     Allergies   Allergen Reactions    Sulfa Antibiotics Nausea and Vomiting     Current providers sharing in care for this patient include:  Patient Care Team:  Darin Pappas MD as PCP - General (Family Practice)  Darin Pappas MD as Assigned PCP    The following health maintenance items are reviewed in Epic and correct as of today:  Health Maintenance   Topic Date Due    INFLUENZA VACCINE (1) 09/01/2024    COVID-19 Vaccine (8 - 2024-25 season) 09/01/2024    BMP  10/18/2024    LIPID  10/18/2024    MEDICARE ANNUAL WELLNESS VISIT  10/18/2024    FALL RISK ASSESSMENT  10/21/2025    GLUCOSE  10/18/2026    COLORECTAL CANCER SCREENING  01/30/2028    ADVANCE CARE PLANNING  10/19/2028    DTAP/TDAP/TD IMMUNIZATION (2 - Td or Tdap) 10/18/2033    HEPATITIS C SCREENING  Completed    PHQ-2 (once per calendar year)  Completed    Pneumococcal Vaccine: 65+ Years  Completed  "   ZOSTER IMMUNIZATION  Completed    RSV VACCINE  Completed    HPV IMMUNIZATION  Aged Out    MENINGITIS IMMUNIZATION  Aged Out    RSV MONOCLONAL ANTIBODY  Aged Out            Objective    Exam  /76   Pulse 53   Temp 96.9  F (36.1  C) (Temporal)   Resp 16   Ht 1.803 m (5' 11\")   Wt 101.7 kg (224 lb 3.2 oz)   SpO2 99%   BMI 31.27 kg/m     Estimated body mass index is 31.27 kg/m  as calculated from the following:    Height as of this encounter: 1.803 m (5' 11\").    Weight as of this encounter: 101.7 kg (224 lb 3.2 oz).    Physical Exam  GENERAL: alert and no distress  EYES: Eyes grossly normal to inspection, PERRL and conjunctivae and sclerae normal  HENT: ear canals and TM's normal, nose and mouth without ulcers or lesions  NECK: no adenopathy, no asymmetry, masses, or scars  RESP: lungs clear to auscultation - no rales, rhonchi or wheezes  CV: regular rate and rhythm, normal S1 S2, no S3 or S4, no murmur, click or rub, no peripheral edema  ABDOMEN: soft, nontender, no hepatosplenomegaly, no masses and bowel sounds normal  MS: no gross musculoskeletal defects noted, no edema  SKIN: no suspicious lesions or rashes. Right plantar 3rd MTP with tender thickened papule. Not verrucous.   NEURO: Normal strength and tone, mentation intact and speech normal  PSYCH: mentation appears normal, affect normal/bright        10/21/2024   Mini Cog   Clock Draw Score 2 Normal   3 Item Recall 2 objects recalled   Mini Cog Total Score 4               Results for orders placed or performed in visit on 10/21/24   BASIC METABOLIC PANEL     Status: Abnormal   Result Value Ref Range    Sodium 140 135 - 145 mmol/L    Potassium 4.1 3.4 - 5.3 mmol/L    Chloride 100 98 - 107 mmol/L    Carbon Dioxide (CO2) 33 (H) 22 - 29 mmol/L    Anion Gap 7 7 - 15 mmol/L    Urea Nitrogen 19.7 8.0 - 23.0 mg/dL    Creatinine 0.91 0.67 - 1.17 mg/dL    GFR Estimate 88 >60 mL/min/1.73m2    Calcium 10.2 8.8 - 10.4 mg/dL    Glucose 111 (H) 70 - 99 mg/dL    " Patient Fasting > 8hrs? Yes    Lipid Profile     Status: Abnormal   Result Value Ref Range    Cholesterol 106 <200 mg/dL    Triglycerides 179 (H) <150 mg/dL    Direct Measure HDL 32 (L) >=40 mg/dL    LDL Cholesterol Calculated 38 <100 mg/dL    Non HDL Cholesterol 74 <130 mg/dL    Patient Fasting > 8hrs? Yes     Narrative    Cholesterol  Desirable: < 200 mg/dL  Borderline High: 200 - 239 mg/dL  High: >= 240 mg/dL    Triglycerides  Normal: < 150 mg/dL  Borderline High: 150 - 199 mg/dL  High: 200-499 mg/dL  Very High: >= 500 mg/dL    Direct Measure HDL  Female: >= 50 mg/dL   Male: >= 40 mg/dL    LDL Cholesterol  Desirable: < 100 mg/dL  Above Desirable: 100 - 129 mg/dL   Borderline High: 130 - 159 mg/dL   High:  160 - 189 mg/dL   Very High: >= 190 mg/dL    Non HDL Cholesterol  Desirable: < 130 mg/dL  Above Desirable: 130 - 159 mg/dL  Borderline High: 160 - 189 mg/dL  High: 190 - 219 mg/dL  Very High: >= 220 mg/dL   PSA Screen GH     Status: Normal   Result Value Ref Range    Prostate Specific Antigen Screen 5.20 0.00 - 6.50 ng/mL    Narrative    This result is obtained using the Roche Elecsys total PSA method on the yadira e601 immunoassay analyzer, which is an ultrasensitive method. Results obtained with different assay methods or kits cannot be used interchangeably.  This test is intended for initial prostate cancer screening. PSA values exceeding the age-specific limits are suspicious for prostate disease, but additional testing, such as prostate biopsy, is needed to diagnose prostate pathology. The American Cancer Society recommends annual examination with digital rectal examination and serum PSA beginning at age 50 and for men with a life expectancy of at least 10 years after detection of prostate cancer. For men in high-risk groups, such as  Americans or men with a first-degree relative diagnosed at a younger age, testing should begin at a younger age. It is generally recommended that information be provided  to patients about the benefits and limitations of testing and treatment so they can make informed decisions.       Signed Electronically by: Darin Pappas MD

## 2024-10-21 NOTE — NURSING NOTE
"Chief Complaint   Patient presents with    Medicare Visit       Initial /76   Pulse 53   Temp 96.9  F (36.1  C) (Temporal)   Resp 16   Ht 1.803 m (5' 11\")   Wt 101.7 kg (224 lb 3.2 oz)   SpO2 99%   BMI 31.27 kg/m   Estimated body mass index is 31.27 kg/m  as calculated from the following:    Height as of this encounter: 1.803 m (5' 11\").    Weight as of this encounter: 101.7 kg (224 lb 3.2 oz).  Medication Reconciliation: complete          "

## 2025-04-21 DIAGNOSIS — F41.8 SITUATIONAL ANXIETY: ICD-10-CM

## 2025-04-23 RX ORDER — HYDROXYZINE HYDROCHLORIDE 25 MG/1
25 TABLET, FILM COATED ORAL 3 TIMES DAILY PRN
Qty: 30 TABLET | Refills: 3 | Status: SHIPPED | OUTPATIENT
Start: 2025-04-23

## 2025-04-23 NOTE — TELEPHONE ENCOUNTER
[id number] ([employer]) of [facility] [place] sent Rx request for the following:      Requested Prescriptions   Pending Prescriptions Disp Refills    hydrOXYzine HCl (ATARAX) 25 MG tablet [Pharmacy Med Name: HYDROXYZINE HCL 25MG TABLET] 30 tablet 3     Sig: TAKE 1 TABLET (25 MG) BY MOUTH 3 TIMES DAILY AS NEEDED FOR ANXIETY   Last Prescription Date:   10/18/23  Last Fill Qty/Refills:         30, R-3    Last Office Visit:              10/21/24 (Px)   Future Office visit:           None    Prescription refilled per RN Medication Refill Policy.................... Delmy Mckenzie RN ....................  4/23/2025   9:21 AM

## (undated) DEVICE — ESU GROUND PAD ADULT W/CORD E7507

## (undated) DEVICE — SOL WATER 1500ML

## (undated) DEVICE — SUCTION MANIFOLD NEPTUNE 2 SYS 4 PORT 0702-020-000

## (undated) DEVICE — ENDO TRAP POLYP E-TRAP 00711099

## (undated) DEVICE — ENDO SNARE POLYPECTOMY CRESENT 20MM LOOP SD-221U-25

## (undated) DEVICE — TUBING SUCTION 10'X3/16" N510

## (undated) DEVICE — ENDO KIT COMPLIANCE DYKENDOCMPLY

## (undated) DEVICE — ENDO BRUSH CHANNEL MASTER CLEANING 2-4.2MM BW-412T

## (undated) RX ORDER — PROPOFOL 10 MG/ML
INJECTION, EMULSION INTRAVENOUS
Status: DISPENSED
Start: 2023-01-30